# Patient Record
Sex: FEMALE | Race: BLACK OR AFRICAN AMERICAN | NOT HISPANIC OR LATINO | Employment: OTHER | URBAN - METROPOLITAN AREA
[De-identification: names, ages, dates, MRNs, and addresses within clinical notes are randomized per-mention and may not be internally consistent; named-entity substitution may affect disease eponyms.]

---

## 2017-09-14 ENCOUNTER — TRANSCRIBE ORDERS (OUTPATIENT)
Dept: ADMINISTRATIVE | Facility: HOSPITAL | Age: 62
End: 2017-09-14

## 2017-09-14 DIAGNOSIS — Z12.31 VISIT FOR SCREENING MAMMOGRAM: Primary | ICD-10-CM

## 2017-10-13 ENCOUNTER — APPOINTMENT (OUTPATIENT)
Dept: MAMMOGRAPHY | Facility: HOSPITAL | Age: 62
End: 2017-10-13
Attending: FAMILY MEDICINE

## 2017-10-17 ENCOUNTER — HOSPITAL ENCOUNTER (OUTPATIENT)
Dept: MAMMOGRAPHY | Facility: HOSPITAL | Age: 62
Discharge: HOME | End: 2017-10-17
Attending: FAMILY MEDICINE | Admitting: FAMILY MEDICINE

## 2017-10-17 DIAGNOSIS — Z12.31 VISIT FOR SCREENING MAMMOGRAM: ICD-10-CM

## 2017-10-17 PROCEDURE — G0202 SCR MAMMO BI INCL CAD: HCPCS

## 2017-10-17 PROCEDURE — 77063 BREAST TOMOSYNTHESIS BI: CPT | Performed by: RADIOLOGY

## 2017-10-17 PROCEDURE — 77063 BREAST TOMOSYNTHESIS BI: CPT

## 2017-10-17 PROCEDURE — 77067 SCR MAMMO BI INCL CAD: CPT | Performed by: RADIOLOGY

## 2018-08-31 ENCOUNTER — TRANSCRIBE ORDERS (OUTPATIENT)
Dept: ADMINISTRATIVE | Facility: HOSPITAL | Age: 63
End: 2018-08-31

## 2018-08-31 DIAGNOSIS — M25.562 PAIN IN JOINT INVOLVING LEFT LOWER LEG: Primary | ICD-10-CM

## 2018-09-06 ENCOUNTER — TRANSCRIBE ORDERS (OUTPATIENT)
Dept: ADMINISTRATIVE | Facility: HOSPITAL | Age: 63
End: 2018-09-06

## 2018-09-06 DIAGNOSIS — Z12.31 VISIT FOR SCREENING MAMMOGRAM: Primary | ICD-10-CM

## 2018-10-18 ENCOUNTER — APPOINTMENT (OUTPATIENT)
Dept: MAMMOGRAPHY | Facility: HOSPITAL | Age: 63
End: 2018-10-18
Attending: FAMILY MEDICINE

## 2018-11-20 ENCOUNTER — HOSPITAL ENCOUNTER (OUTPATIENT)
Dept: MAMMOGRAPHY | Facility: HOSPITAL | Age: 63
Discharge: HOME | End: 2018-11-20
Attending: FAMILY MEDICINE | Admitting: FAMILY MEDICINE

## 2018-11-20 DIAGNOSIS — Z12.31 VISIT FOR SCREENING MAMMOGRAM: ICD-10-CM

## 2018-11-20 PROCEDURE — 77067 SCR MAMMO BI INCL CAD: CPT

## 2018-11-20 PROCEDURE — 77063 BREAST TOMOSYNTHESIS BI: CPT

## 2018-11-20 PROCEDURE — 77067 SCR MAMMO BI INCL CAD: CPT | Performed by: RADIOLOGY

## 2018-11-20 PROCEDURE — 77063 BREAST TOMOSYNTHESIS BI: CPT | Performed by: RADIOLOGY

## 2018-12-04 ENCOUNTER — HOSPITAL ENCOUNTER (OUTPATIENT)
Dept: MAMMOGRAPHY | Facility: HOSPITAL | Age: 63
Discharge: HOME | End: 2018-12-04
Admitting: RADIOLOGY

## 2018-12-04 ENCOUNTER — HOSPITAL ENCOUNTER (OUTPATIENT)
Dept: ULTRASOUND IMAGING | Facility: HOSPITAL | Age: 63
Discharge: HOME | End: 2018-12-04

## 2018-12-04 DIAGNOSIS — R92.8 ABNORMAL MAMMOGRAM: ICD-10-CM

## 2018-12-04 PROCEDURE — 77065 DX MAMMO INCL CAD UNI: CPT | Performed by: RADIOLOGY

## 2018-12-04 PROCEDURE — 76642 ULTRASOUND BREAST LIMITED: CPT | Performed by: RADIOLOGY

## 2018-12-04 PROCEDURE — 77065 DX MAMMO INCL CAD UNI: CPT

## 2018-12-04 PROCEDURE — 76642 ULTRASOUND BREAST LIMITED: CPT

## 2018-12-04 PROCEDURE — G0279 TOMOSYNTHESIS, MAMMO: HCPCS

## 2018-12-04 PROCEDURE — 77061 BREAST TOMOSYNTHESIS UNI: CPT | Performed by: RADIOLOGY

## 2018-12-14 ENCOUNTER — TELEPHONE (OUTPATIENT)
Dept: MAMMOGRAPHY | Facility: HOSPITAL | Age: 63
End: 2018-12-14

## 2018-12-17 ENCOUNTER — TRANSCRIBE ORDERS (OUTPATIENT)
Dept: MAMMOGRAPHY | Facility: HOSPITAL | Age: 63
End: 2018-12-17

## 2018-12-17 DIAGNOSIS — R92.8 ABNORMAL MAMMOGRAM: Primary | ICD-10-CM

## 2019-01-18 ENCOUNTER — HOSPITAL ENCOUNTER (OUTPATIENT)
Dept: MAMMOGRAPHY | Facility: HOSPITAL | Age: 64
Discharge: HOME | End: 2019-01-18
Admitting: RADIOLOGY

## 2019-01-18 ENCOUNTER — HOSPITAL ENCOUNTER (OUTPATIENT)
Dept: MAMMOGRAPHY | Facility: HOSPITAL | Age: 64
Discharge: HOME | End: 2019-01-18

## 2019-01-18 DIAGNOSIS — R92.8 ABNORMAL MAMMOGRAM: ICD-10-CM

## 2019-01-18 PROCEDURE — A4648 IMPLANTABLE TISSUE MARKER: HCPCS

## 2019-01-18 PROCEDURE — 77065 DX MAMMO INCL CAD UNI: CPT | Performed by: RADIOLOGY

## 2019-01-18 PROCEDURE — 88360 TUMOR IMMUNOHISTOCHEM/MANUAL: CPT | Performed by: FAMILY MEDICINE

## 2019-01-18 PROCEDURE — 19081 BX BREAST 1ST LESION STRTCTC: CPT | Performed by: RADIOLOGY

## 2019-01-18 PROCEDURE — 88305 TISSUE EXAM BY PATHOLOGIST: CPT | Performed by: FAMILY MEDICINE

## 2019-01-18 RX ORDER — LIDOCAINE HYDROCHLORIDE AND EPINEPHRINE 10; 10 MG/ML; UG/ML
10 INJECTION, SOLUTION INFILTRATION; PERINEURAL ONCE
Status: COMPLETED | OUTPATIENT
Start: 2019-01-18 | End: 2019-01-18

## 2019-01-18 RX ORDER — LIDOCAINE HYDROCHLORIDE 10 MG/ML
5 INJECTION, SOLUTION INFILTRATION; PERINEURAL ONCE
Status: COMPLETED | OUTPATIENT
Start: 2019-01-18 | End: 2019-01-18

## 2019-01-18 RX ADMIN — LIDOCAINE HYDROCHLORIDE,EPINEPHRINE BITARTRATE 5 ML: 10; .01 INJECTION, SOLUTION INFILTRATION; PERINEURAL at 13:40

## 2019-01-18 RX ADMIN — LIDOCAINE HYDROCHLORIDE 2 ML: 10 INJECTION, SOLUTION INFILTRATION; PERINEURAL at 13:35

## 2019-01-22 LAB
CYTO UR: NORMAL
LAB AP CASE REPORT: NORMAL
LAB AP CLINICAL INFORMATION: NORMAL
LAB AP DIAGNOSIS COMMENT: NORMAL
LAB AP SPECIAL STAINS: NORMAL
PATH REPORT.FINAL DX SPEC: NORMAL
PATH REPORT.GROSS SPEC: NORMAL

## 2019-01-23 ENCOUNTER — TELEPHONE (OUTPATIENT)
Dept: MAMMOGRAPHY | Facility: HOSPITAL | Age: 64
End: 2019-01-23

## 2019-01-23 NOTE — TELEPHONE ENCOUNTER
Referring provider's office notified pathology returned as cancer & patient will be notified. Pt notified of pathology results and recommendation. Verbalizes understanding. Denies discomfort. Denies signs and symptoms of infection. Pt desires to research surgeon choice. Pt is to call back with decision; an appointment will then be scheduled. Verbalizes understanding. Patient encouraged to call back with any questions or concerns. Patient verbalized understanding. Breast cancer information packet offered and accepted.

## 2019-01-24 ENCOUNTER — TELEPHONE (OUTPATIENT)
Dept: MAMMOGRAPHY | Facility: HOSPITAL | Age: 64
End: 2019-01-24

## 2019-01-24 NOTE — TELEPHONE ENCOUNTER
Patient called in; notified of surgical consult appointment with Dr. Lim on 02.20.19 @8229. Told to bring photo ID, insurance cards & list of current medications. Questions answered, support given. Patient was encouraged to call back with any questions or concerns. Patient verbalized understanding.

## 2019-01-30 ENCOUNTER — TELEPHONE (OUTPATIENT)
Dept: MAMMOGRAPHY | Facility: HOSPITAL | Age: 64
End: 2019-01-30

## 2019-01-30 NOTE — TELEPHONE ENCOUNTER
Returned;  questions answered, support given. Encouraged to call back with further questions or concerns. Patient verbalized understanding.

## 2019-02-20 ENCOUNTER — CLINICAL SUPPORT (OUTPATIENT)
Dept: GENETICS | Facility: HOSPITAL | Age: 64
End: 2019-02-20

## 2019-02-20 ENCOUNTER — APPOINTMENT (OUTPATIENT)
Dept: LAB | Facility: HOSPITAL | Age: 64
End: 2019-02-20

## 2019-02-20 DIAGNOSIS — C50.912 MALIGNANT NEOPLASM OF LEFT BREAST IN FEMALE, ESTROGEN RECEPTOR POSITIVE, UNSPECIFIED SITE OF BREAST (HCC): Primary | ICD-10-CM

## 2019-02-20 DIAGNOSIS — Z80.42 FAMILY HISTORY OF PROSTATE CANCER: ICD-10-CM

## 2019-02-20 DIAGNOSIS — Z80.0 FAMILY HISTORY OF PANCREATIC CANCER: ICD-10-CM

## 2019-02-20 DIAGNOSIS — Z13.79 GENETIC TESTING: Primary | ICD-10-CM

## 2019-02-20 DIAGNOSIS — Z17.0 MALIGNANT NEOPLASM OF LEFT BREAST IN FEMALE, ESTROGEN RECEPTOR POSITIVE, UNSPECIFIED SITE OF BREAST (HCC): Primary | ICD-10-CM

## 2019-02-20 DIAGNOSIS — Z80.3 FAMILY HISTORY OF BREAST CANCER: ICD-10-CM

## 2019-02-20 DIAGNOSIS — Z80.0 FAMILY HISTORY OF COLON CANCER: ICD-10-CM

## 2019-02-20 PROCEDURE — 96040: CPT | Performed by: GENETIC COUNSELOR, MS

## 2019-02-21 ENCOUNTER — DOCUMENTATION (OUTPATIENT)
Dept: ONCOLOGY | Facility: CLINIC | Age: 64
End: 2019-02-21

## 2019-02-21 NOTE — PROGRESS NOTES
I saw patient on 2/20/19 with Dr SHAFFER and. Dr SHAFFER reviewed the pathology report and surgical options. The patient prefers BCT and saw genetic 2/20/19. An MRI has been ordered - educational and supportive materials were given and reviewed.

## 2019-02-21 NOTE — PROGRESS NOTES
Gloria Alvarado, a 63-year-old female, was seen for genetic counseling due to a personal history of breast cancer. Ms. Alvarado was recently diagnosed with an ER/LA positive breast cancer of the left breast at 63. She is making a surgical decision. She had a hysterectomy at age 23 and a BSO at 25. She was interested in discussing her risk for a hereditary cancer syndrome.  Ms. Alvarado was interested in pursuing a multi-gene panel to evaluate her risk of cancer, therefore the CancerNext panel was ordered through [facility] which analyzes BRCA1/2 and 32 additional genes associated with an increased cancer risk. The genes on this panel include APC, LAURA, BARD1, BMPR1A, BRCA1, BRCA2, BRIP1, CDH1, CDK4, CDKN2A, CHEK2, DICER1, EPCAM, GREM1, HOXB13, MLH1, MRE11A, MSH2, MSH6, MUTYH, NBN, NF1, PALB2, PMS2, POLD1, POLE, PTEN, RAD50, RAD51C, RAD51D, SMAD4, SMARCA4, STK11, and TP53. Results are expected within 2-3 weeks.     PERTINENT FAMILY HISTORY: (See attached pedigree)   Sister:   Breast cancer, 52  Brother:  Colon cancer, early 60s; Prostate cancer, mid 60s; Pancreatic cancer, 60s  Brother:  Prostate cancer, 60s  Pat. Aunt:  Breast cancer, 60s; Colon cancer  Pat. Aunt:  Colon cancer, 70s  Mother:  Cervical cancer, 40s  Mat. Grandmother: Cervical cancer, 60s  Mat. Uncle:  Lung cancer    We do not have medical records regarding any of these diagnoses.      RISK ASSESSMENT:  Ms. Alvarado’s personal and family history of cancer led to concern for a hereditary cancer syndrome. We discussed BRCA1/2 testing as well as the option of pursuing a panel that would test for other genes known to impact cancer risk in addition to BRCA1/2.   Ms. Alvarado clearly meets NCCN guidelines criteria for BRCA1/2 testing based on her personal and family history of breast cancer. These risk assessments are based on the family history information provided at the time of the appointment, and could change in the future should new information be  obtained.    GENETIC COUNSELING (60 minutes):  We reviewed the family history information in detail. Cases of cancer follow three general patterns: sporadic, familial, and hereditary.  While most cancer is sporadic, some cases appear to occur in family clusters.  These cases are said to be familial and account for 10-20% of cancer cases.  Familial cases may be due to a combination of shared genes and environmental factors among family members.  In even fewer families, the cancer is said to be inherited, and the genes responsible for the cancer are known.      Family histories typical of hereditary cancer syndromes usually include multiple first- and second-degree relatives diagnosed with cancer types that define a syndrome.  These cases tend to be diagnosed at younger-than-expected ages and can be bilateral or multifocal.  The cancer in these families follows an autosomal dominant inheritance pattern, which indicates the likely presence of a mutation in a cancer susceptibility gene.  Children and siblings of an individual believed to carry this mutation have a 50% chance of inheriting that mutation, thereby inheriting the increased risk to develop cancer.  These mutations can be passed down from the maternal or the paternal lineage.    Hereditary breast cancer accounts for 5-10% of all cases of breast cancer.  A significant proportion of hereditary breast and ovarian cancer can be attributed to mutations in the BRCA1 and BRCA2 genes.  Mutations in these genes confer an increased risk for breast cancer, ovarian cancer, male breast cancer, prostate cancer, and pancreatic cancer. Women with a BRCA1 or BRCA2 mutation who have already been diagnosed with breast cancer have a 40-60% lifetime risk of a second breast cancer. Women with a BRCA1 or BRCA2 mutation have up to a 44% risk of ovarian cancer.      We discussed that there are other genes that are known to be associated with an increased risk for cancer.  Some of  these genes have well defined cancer risks and established management guidelines.  Other genes that can be tested for have been more recently described, and there may be less data regarding the risks and therefore may not have established management guidelines. We discussed these limitations at length.  Based on Ms. Alvarado’s desire to get as much information as possible regarding her personal risks and potential risks for her family, she opted to pursue testing through a panel that would look at several other genes known to increase the risk for cancer.    GENETIC TESTING:  The risks, benefits and limitations of genetic testing and implications for clinical management following testing were reviewed.  DNA test results can influence decisions regarding screening, prevention and surgical management.  Genetic testing can have significant psychological implications for both individuals and families.  Also discussed was the possibility of employment and insurance discrimination based on genetic test results and the laws in place to prevent this ([place]).    We discussed panel testing, which would involve testing for BRCA1/2 as well as 32 additional genes that are associated with increased cancer risk. The benefits and limitations of genetic testing were discussed and Ms. Alvarado decided to pursue testing via the panel. The implications of a positive or negative test result were discussed. We discussed the possibility that, in some cases, genetic test results may be informative or may be ambiguous due to the identification of a genetic variant. These variants may or may not be associated with an increased cancer risk.  With multigene panel testing, it is not uncommon for a variant of uncertain significance (VUS) to be identified.  If a VUS is identified, testing family members is typically not recommended and screening recommendations are made based on the family history.  The laboratories that perform genetic testing work  to reclassify the VUS and send out an amended report if and when a VUS is reclassified.  The majority of variant findings are ultimately reclassified to a negative result.  Given her personal and family history, a negative test result would not eliminate all cancer risk to her relatives, although the risk would not be as high as it would with positive genetic testing.      PLAN: Genetic testing via the CancerNext panel through [facility] was ordered and results are expected within 2-3 weeks. We will call her to discuss these results once they are received. Ms. Alvarado is welcome to contact us in the meantime with any questions she may have.      Juanis Romano MS, [facility] [place], [facility]  Licensed Certified Genetic Counselor

## 2019-02-22 ENCOUNTER — TELEPHONE (OUTPATIENT)
Dept: ONCOLOGY | Facility: CLINIC | Age: 64
End: 2019-02-22

## 2019-02-22 NOTE — TELEPHONE ENCOUNTER
Patient called requesting something to take for claustrophobia prior to MRI breast 02/26/2019. Notified Dr. SHAFFER's assistant. She will review with him Monday and call in prescription to [facility] [address] if directed. Encouraged patient to call with questions or concerns.

## 2019-02-26 ENCOUNTER — DOCUMENTATION (OUTPATIENT)
Dept: ONCOLOGY | Facility: CLINIC | Age: 64
End: 2019-02-26

## 2019-02-26 ENCOUNTER — HOSPITAL ENCOUNTER (OUTPATIENT)
Dept: MRI IMAGING | Facility: HOSPITAL | Age: 64
Discharge: HOME | End: 2019-02-26
Admitting: SURGERY

## 2019-02-26 DIAGNOSIS — C50.212 MALIGNANT NEOPLASM OF UPPER-INNER QUADRANT OF LEFT FEMALE BREAST (HCC): ICD-10-CM

## 2019-02-26 LAB — CREAT BLDA-MCNC: 1 MG/DL (ref 0.6–1.3)

## 2019-02-26 PROCEDURE — A9577 INJ MULTIHANCE: HCPCS | Performed by: SURGERY

## 2019-02-26 PROCEDURE — 0 GADOBENATE DIMEGLUMINE 529 MG/ML SOLUTION: Performed by: SURGERY

## 2019-02-26 PROCEDURE — 77049 MRI BREAST C-+ W/CAD BI: CPT | Performed by: RADIOLOGY

## 2019-02-26 PROCEDURE — 82565 ASSAY OF CREATININE: CPT

## 2019-02-26 PROCEDURE — 77049 MRI BREAST C-+ W/CAD BI: CPT

## 2019-02-26 RX ADMIN — GADOBENATE DIMEGLUMINE 14 ML: 529 INJECTION, SOLUTION INTRAVENOUS at 11:27

## 2019-02-27 ENCOUNTER — DOCUMENTATION (OUTPATIENT)
Dept: GENETICS | Facility: HOSPITAL | Age: 64
End: 2019-02-27

## 2019-02-28 ENCOUNTER — TELEPHONE (OUTPATIENT)
Dept: MRI IMAGING | Facility: HOSPITAL | Age: 64
End: 2019-02-28

## 2019-02-28 NOTE — TELEPHONE ENCOUNTER
Called pt with Breast MRI results. Pt recommended to have bilateral MRI BX scheduled for 3/6/2019 at 8:00 to arrive at 7:40. Pt to bring a . Pt to obtain some medication for claustrophobia. Pt has stopped her blood thinners. Pt expressed understanding and was encouraged to call with any further questions or concerns.

## 2019-03-04 ENCOUNTER — DOCUMENTATION (OUTPATIENT)
Dept: ONCOLOGY | Facility: CLINIC | Age: 64
End: 2019-03-04

## 2019-03-04 NOTE — PROGRESS NOTES
Patient called to ask for another script for valium for her MRI guided biopsy tomorrow -BUSHRA Ramírez at [facility] said she would call it in. Patient aware

## 2019-03-06 ENCOUNTER — HOSPITAL ENCOUNTER (OUTPATIENT)
Dept: MRI IMAGING | Facility: HOSPITAL | Age: 64
Discharge: HOME | End: 2019-03-06
Admitting: RADIOLOGY

## 2019-03-06 ENCOUNTER — HOSPITAL ENCOUNTER (OUTPATIENT)
Dept: MAMMOGRAPHY | Facility: HOSPITAL | Age: 64
Discharge: HOME | End: 2019-03-06

## 2019-03-06 ENCOUNTER — HOSPITAL ENCOUNTER (OUTPATIENT)
Dept: MRI IMAGING | Facility: HOSPITAL | Age: 64
Discharge: HOME | End: 2019-03-06

## 2019-03-06 DIAGNOSIS — C50.212 MALIGNANT NEOPLASM OF UPPER-INNER QUADRANT OF LEFT FEMALE BREAST (HCC): ICD-10-CM

## 2019-03-06 PROCEDURE — 88305 TISSUE EXAM BY PATHOLOGIST: CPT | Performed by: SURGERY

## 2019-03-06 PROCEDURE — A4648 IMPLANTABLE TISSUE MARKER: HCPCS

## 2019-03-06 PROCEDURE — 77065 DX MAMMO INCL CAD UNI: CPT | Performed by: RADIOLOGY

## 2019-03-06 PROCEDURE — 0 GADOBENATE DIMEGLUMINE 529 MG/ML SOLUTION: Performed by: SURGERY

## 2019-03-06 PROCEDURE — 19085 BX BREAST 1ST LESION MR IMAG: CPT | Performed by: RADIOLOGY

## 2019-03-06 PROCEDURE — A9577 INJ MULTIHANCE: HCPCS | Performed by: SURGERY

## 2019-03-06 RX ORDER — LIDOCAINE HYDROCHLORIDE 10 MG/ML
2.5 INJECTION, SOLUTION INFILTRATION; PERINEURAL
Status: COMPLETED | OUTPATIENT
Start: 2019-03-06 | End: 2019-03-06

## 2019-03-06 RX ORDER — LIDOCAINE HYDROCHLORIDE AND EPINEPHRINE 10; 10 MG/ML; UG/ML
4 INJECTION, SOLUTION INFILTRATION; PERINEURAL
Status: COMPLETED | OUTPATIENT
Start: 2019-03-06 | End: 2019-03-06

## 2019-03-06 RX ADMIN — GADOBENATE DIMEGLUMINE 13 ML: 529 INJECTION, SOLUTION INTRAVENOUS at 08:58

## 2019-03-06 RX ADMIN — LIDOCAINE HYDROCHLORIDE,EPINEPHRINE BITARTRATE 4 ML: 10; .01 INJECTION, SOLUTION INFILTRATION; PERINEURAL at 09:00

## 2019-03-06 RX ADMIN — LIDOCAINE HYDROCHLORIDE 2.5 ML: 10 INJECTION, SOLUTION INFILTRATION; PERINEURAL at 08:58

## 2019-03-07 LAB
CYTO UR: NORMAL
LAB AP CASE REPORT: NORMAL
LAB AP CLINICAL INFORMATION: NORMAL
LAB AP DIAGNOSIS COMMENT: NORMAL
PATH REPORT.FINAL DX SPEC: NORMAL
PATH REPORT.GROSS SPEC: NORMAL

## 2019-03-11 ENCOUNTER — TELEPHONE (OUTPATIENT)
Dept: MAMMOGRAPHY | Facility: HOSPITAL | Age: 64
End: 2019-03-11

## 2019-03-13 ENCOUNTER — TRANSCRIBE ORDERS (OUTPATIENT)
Dept: PREADMISSION TESTING | Facility: HOSPITAL | Age: 64
End: 2019-03-13

## 2019-03-13 ENCOUNTER — TRANSCRIBE ORDERS (OUTPATIENT)
Dept: ADMINISTRATIVE | Facility: HOSPITAL | Age: 64
End: 2019-03-13

## 2019-03-13 ENCOUNTER — TRANSCRIBE ORDERS (OUTPATIENT)
Dept: MAMMOGRAPHY | Facility: HOSPITAL | Age: 64
End: 2019-03-13

## 2019-03-13 DIAGNOSIS — C50.212 MALIGNANT NEOPLASM OF UPPER-INNER QUADRANT OF LEFT FEMALE BREAST, UNSPECIFIED ESTROGEN RECEPTOR STATUS (HCC): Primary | ICD-10-CM

## 2019-03-20 ENCOUNTER — DOCUMENTATION (OUTPATIENT)
Dept: ONCOLOGY | Facility: CLINIC | Age: 64
End: 2019-03-20

## 2019-03-20 NOTE — PROGRESS NOTES
Patient called and left message to ask for note from Dr SHAFFER that she could be off work for 1 week after surgery - I called her back and left her a message to call me

## 2019-03-20 NOTE — PROGRESS NOTES
Patient called to ask for letter to be sent for Jury Duty release - I sent email to BUSHRA Ramírez with all the information and she stated that she would have Dr DEENA mckenzie letter and she would mail it in - patient aware

## 2019-03-27 ENCOUNTER — APPOINTMENT (OUTPATIENT)
Dept: PREADMISSION TESTING | Facility: HOSPITAL | Age: 64
End: 2019-03-27

## 2019-03-27 LAB
ALBUMIN SERPL-MCNC: 4.66 G/DL (ref 3.2–4.8)
ALBUMIN/GLOB SERPL: 2.1 G/DL (ref 1.5–2.5)
ALP SERPL-CCNC: 78 U/L (ref 25–100)
ALT SERPL W P-5'-P-CCNC: 17 U/L (ref 7–40)
ANION GAP SERPL CALCULATED.3IONS-SCNC: 11 MMOL/L (ref 3–11)
AST SERPL-CCNC: 20 U/L (ref 0–33)
BILIRUB SERPL-MCNC: 0.5 MG/DL (ref 0.3–1.2)
BUN BLD-MCNC: 17 MG/DL (ref 9–23)
BUN/CREAT SERPL: 17 (ref 7–25)
CALCIUM SPEC-SCNC: 10.2 MG/DL (ref 8.7–10.4)
CHLORIDE SERPL-SCNC: 102 MMOL/L (ref 99–109)
CO2 SERPL-SCNC: 30 MMOL/L (ref 20–31)
CREAT BLD-MCNC: 1 MG/DL (ref 0.6–1.3)
DEPRECATED RDW RBC AUTO: 46.7 FL (ref 37–54)
ERYTHROCYTE [DISTWIDTH] IN BLOOD BY AUTOMATED COUNT: 14.3 % (ref 11.3–14.5)
GFR SERPL CREATININE-BSD FRML MDRD: 68 ML/MIN/1.73
GLOBULIN UR ELPH-MCNC: 2.2 GM/DL
GLUCOSE BLD-MCNC: 110 MG/DL (ref 70–100)
HCT VFR BLD AUTO: 43.2 % (ref 34.5–44)
HGB BLD-MCNC: 14 G/DL (ref 11.5–15.5)
MCH RBC QN AUTO: 29 PG (ref 27–31)
MCHC RBC AUTO-ENTMCNC: 32.4 G/DL (ref 32–36)
MCV RBC AUTO: 89.6 FL (ref 80–99)
PLATELET # BLD AUTO: 272 10*3/MM3 (ref 150–450)
PMV BLD AUTO: 11.4 FL (ref 6–12)
POTASSIUM BLD-SCNC: 4 MMOL/L (ref 3.5–5.5)
PROT SERPL-MCNC: 6.9 G/DL (ref 5.7–8.2)
RBC # BLD AUTO: 4.82 10*6/MM3 (ref 3.89–5.14)
SODIUM BLD-SCNC: 143 MMOL/L (ref 132–146)
WBC NRBC COR # BLD: 5.81 10*3/MM3 (ref 3.5–10.8)

## 2019-03-27 PROCEDURE — 85027 COMPLETE CBC AUTOMATED: CPT | Performed by: SURGERY

## 2019-03-27 PROCEDURE — 36415 COLL VENOUS BLD VENIPUNCTURE: CPT

## 2019-03-27 PROCEDURE — 80053 COMPREHEN METABOLIC PANEL: CPT | Performed by: SURGERY

## 2019-03-29 ENCOUNTER — HOSPITAL ENCOUNTER (OUTPATIENT)
Dept: MAMMOGRAPHY | Facility: HOSPITAL | Age: 64
Discharge: HOME | End: 2019-03-29

## 2019-03-29 ENCOUNTER — HOSPITAL ENCOUNTER (OUTPATIENT)
Dept: MAMMOGRAPHY | Facility: HOSPITAL | Age: 64
Discharge: HOME | End: 2019-03-29
Admitting: SURGERY

## 2019-03-29 ENCOUNTER — LAB REQUISITION (OUTPATIENT)
Dept: LAB | Facility: HOSPITAL | Age: 64
End: 2019-03-29

## 2019-03-29 ENCOUNTER — HOSPITAL ENCOUNTER (OUTPATIENT)
Dept: NUCLEAR MEDICINE | Facility: HOSPITAL | Age: 64
Discharge: HOME | End: 2019-03-29

## 2019-03-29 DIAGNOSIS — C50.212 MALIGNANT NEOPLASM OF UPPER-INNER QUADRANT OF LEFT FEMALE BREAST, UNSPECIFIED ESTROGEN RECEPTOR STATUS (HCC): ICD-10-CM

## 2019-03-29 DIAGNOSIS — C50.212 MALIGNANT NEOPLASM OF UPPER-INNER QUADRANT OF LEFT FEMALE BREAST (HCC): ICD-10-CM

## 2019-03-29 PROCEDURE — 19281 PERQ DEVICE BREAST 1ST IMAG: CPT | Performed by: RADIOLOGY

## 2019-03-29 PROCEDURE — 76098 X-RAY EXAM SURGICAL SPECIMEN: CPT | Performed by: RADIOLOGY

## 2019-03-29 PROCEDURE — 88307 TISSUE EXAM BY PATHOLOGIST: CPT | Performed by: SURGERY

## 2019-03-29 PROCEDURE — A9541 TC99M SULFUR COLLOID: HCPCS | Performed by: SURGERY

## 2019-03-29 PROCEDURE — 0 TECHNETIUM FILTERED SULFUR COLLOID: Performed by: SURGERY

## 2019-03-29 PROCEDURE — 76098 X-RAY EXAM SURGICAL SPECIMEN: CPT

## 2019-03-29 PROCEDURE — 38792 RA TRACER ID OF SENTINL NODE: CPT

## 2019-03-29 RX ORDER — LIDOCAINE HYDROCHLORIDE 10 MG/ML
5 INJECTION, SOLUTION INFILTRATION; PERINEURAL ONCE
Status: COMPLETED | OUTPATIENT
Start: 2019-03-29 | End: 2019-03-29

## 2019-03-29 RX ADMIN — LIDOCAINE HYDROCHLORIDE 1 ML: 10 INJECTION, SOLUTION INFILTRATION; PERINEURAL at 09:15

## 2019-03-29 RX ADMIN — TECHNETIUM TC 99M SULFUR COLLOID 1 DOSE: KIT at 11:10

## 2019-03-29 RX ADMIN — METHYLENE BLUE 10 MG: 10 INJECTION INTRAVENOUS at 09:15

## 2019-04-03 ENCOUNTER — OFFICE VISIT (OUTPATIENT)
Dept: RADIATION ONCOLOGY | Facility: HOSPITAL | Age: 64
End: 2019-04-03

## 2019-04-03 ENCOUNTER — HOSPITAL ENCOUNTER (OUTPATIENT)
Dept: RADIATION ONCOLOGY | Facility: HOSPITAL | Age: 64
Setting detail: RADIATION/ONCOLOGY SERIES
Discharge: HOME | End: 2019-04-03

## 2019-04-03 DIAGNOSIS — Z17.0 MALIGNANT NEOPLASM OF UPPER-INNER QUADRANT OF LEFT BREAST IN FEMALE, ESTROGEN RECEPTOR POSITIVE (HCC): Primary | ICD-10-CM

## 2019-04-03 DIAGNOSIS — C50.212 MALIGNANT NEOPLASM OF UPPER-INNER QUADRANT OF LEFT BREAST IN FEMALE, ESTROGEN RECEPTOR POSITIVE (HCC): Primary | ICD-10-CM

## 2019-04-03 PROCEDURE — G0463 HOSPITAL OUTPT CLINIC VISIT: HCPCS

## 2019-04-03 NOTE — PROGRESS NOTES
CONSULTATION NOTE    NAME:      Gloria Alvarado  :                                                          1955  DATE OF CONSULTATION:                       4/3/2019   REQUESTING PHYSICIAN:                   Volodymyr Lim MD   REASON FOR CONSULTATION:           Cancer Staging  Malignant neoplasm of upper-inner quadrant of left breast in female, estrogen receptor positive (CMS/HCC)  Staging form: Breast, AJCC 8th Edition  - Pathologic stage from 4/3/2019: Stage IA (pT1b, pN0(sn), cM0, G1, ER: Positive, DE: Positive, HER2: Negative) - Signed by Lorri Orantes MD on 4/3/2019         BRIEF HISTORY:  Gloria Alvarado  is a very pleasant 63 y.o. female  who had an abnormality in the upper outer quadrant of the left breast.  Biopsy was positive for low-grade infiltrating ductal carcinoma.  On MRI she had an area of concern in the right breast as well but biopsy was negative for carcinoma.  She underwent left breast lumpectomy and was found to have a 1 cm, low-grade, ER DE positive tumor and extended margins were negative.  She is recovering well from surgery and here to discuss postoperative radiation.  She is scheduled to see Dr. Zhu next week regarding adjuvant chemotherapy and/or hormonal blockade.  She has no complaints today.  Allergies   Allergen Reactions   • Peanut-Containing Drug Products Shortness Of Breath       Social History     Tobacco Use   • Smoking status: Never Smoker   Substance Use Topics   • Alcohol use: No     Frequency: Never   • Drug use: No         Past Medical History:   Diagnosis Date   • Anxiety    • Arthritis    • Depression    • Hypertension    • Malignant neoplasm of upper-inner quadrant of left breast in female, estrogen receptor positive (CMS/HCC) 4/3/2019       family history includes Breast cancer in her paternal aunt; Breast cancer (age of onset: 54) in her sister.     Past Surgical History:   Procedure Laterality Date   • HEMORRHOIDECTOMY     • HYSTERECTOMY     •  KNEE SURGERY     • OOPHORECTOMY  1982        Review of Systems   Eyes:        Wears glasses or contact lenses.   Musculoskeletal: Positive for back pain and myalgias.   All other systems reviewed and are negative.          Objective   VITAL SIGNS: Weight 156 pounds height 64.5 inches blood pressure 140/77 pulse 66  KPS       90 %    Physical Exam   Constitutional: She is oriented to person, place, and time. She appears well-developed and well-nourished. No distress.   HENT:   Head: Normocephalic and atraumatic.   Eyes: EOM are normal. No scleral icterus.   Neck: Neck supple.   Cardiovascular: Normal rate and regular rhythm.   Pulmonary/Chest: Effort normal and breath sounds normal.   Lymphadenopathy:     She has no cervical adenopathy.   Neurological: She is alert and oriented to person, place, and time.   Nursing note and vitals reviewed.  The breast exam reveals the breasts are symmetric.  The right breast has no masses or suspicious lesions.  The left breast has a well-healed surgical incision from the 10:00 to 12:00 o'clock position adjacent to the nipple areolar complex.  She has a well-healed incision in the left axilla.  She has no axillary adenopathy bilaterally.         The following portions of the patient's history were reviewed and updated as appropriate: allergies, current medications, past family history, past medical history, past social history, past surgical history and problem list.    Assessment      IMPRESSION:   Gloria Alvarado   had an abnormality in the upper outer quadrant of the left breast and biopsy was positive for low-grade infiltrating ductal carcinoma.  An area of concern in the right breast had biopsy negative for carcinoma.  She underwent left breast lumpectomy for a 1 cm, low-grade, ER ME positive tumor and extended margins were negative.  She is recovering well from surgery and here to discuss postoperative radiation.  She is scheduled to see Dr. Emmanuel next week regarding adjuvant  chemotherapy and/or hormonal blockade.  She has no complaints today.    RECOMMENDATIONS: I recommend postoperative radiotherapy of 40.05 Gy in 15 fractions and no tumor bed boost for this low-grade tumor in a patient over 60 years old.  The pros and cons, risks and benefits of treatment were discussed and informed consent obtained.  An appointment is made for her return to our department for treatment planning after her appointment with Dr. Emmanuel.  Thank you very much for letting me part spent in her care..          Lorri Orantes MD      Errors in dictation may reflect use of voice recognition software and not all errors in transcription may have been detected prior to signing.

## 2019-04-04 LAB
CYTO UR: NORMAL
LAB AP CASE REPORT: NORMAL
LAB AP CLINICAL INFORMATION: NORMAL
PATH REPORT.FINAL DX SPEC: NORMAL
PATH REPORT.GROSS SPEC: NORMAL

## 2019-04-10 ENCOUNTER — CONSULT (OUTPATIENT)
Dept: ONCOLOGY | Facility: CLINIC | Age: 64
End: 2019-04-10

## 2019-04-10 VITALS
HEART RATE: 74 BPM | TEMPERATURE: 97.6 F | SYSTOLIC BLOOD PRESSURE: 134 MMHG | RESPIRATION RATE: 18 BRPM | WEIGHT: 157 LBS | OXYGEN SATURATION: 99 % | DIASTOLIC BLOOD PRESSURE: 89 MMHG

## 2019-04-10 DIAGNOSIS — Z17.0 MALIGNANT NEOPLASM OF UPPER-INNER QUADRANT OF LEFT BREAST IN FEMALE, ESTROGEN RECEPTOR POSITIVE (HCC): Primary | ICD-10-CM

## 2019-04-10 DIAGNOSIS — C50.212 MALIGNANT NEOPLASM OF UPPER-INNER QUADRANT OF LEFT BREAST IN FEMALE, ESTROGEN RECEPTOR POSITIVE (HCC): Primary | ICD-10-CM

## 2019-04-10 PROCEDURE — 99204 OFFICE O/P NEW MOD 45 MIN: CPT | Performed by: INTERNAL MEDICINE

## 2019-04-10 RX ORDER — ANASTROZOLE 1 MG/1
1 TABLET ORAL DAILY
Qty: 30 TABLET | Refills: 11 | Status: SHIPPED | OUTPATIENT
Start: 2019-04-10 | End: 2020-04-06

## 2019-04-10 RX ORDER — ATENOLOL AND CHLORTHALIDONE TABLET 50; 25 MG/1; MG/1
0.5 TABLET ORAL DAILY
COMMUNITY
Start: 2019-01-17

## 2019-04-10 NOTE — PROGRESS NOTES
Subjective     PROBLEM LIST:  1. [id number] (Stage IA) ER+ NM+ Her2 negative invasive ductal carcinoma of the left breast  A) left lumpectomy on 3/29/19 showed a 1.0 cm low grade IDC.  0/3 SLN involved  2. Anxiety/depression  3. Hypertension  4. arthritis    CHIEF COMPLAINT: breast cancer      HISTORY OF PRESENT ILLNESS:  The patient is a 63 y.o. year old female, referred for evaluation of a recently diagnosed breast cancer.  The lesion was found on a routine screening mammogram which she gets every year.  She had a biopsy and underwent lumpectomy about 2 weeks ago.  She says she is recovering well from the surgery.  She has a history of a hysterectomy several years ago and was on hormone replacement therapy until about 5-10 years ago.  She says when she stopped this she had a lot of hot flashes and mood changes but this has all resolved.  She has a family history of breast cancer in her sister and paternal aunt.  She had genetic testing done which was negative.    She lives alone.  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    REVIEW OF SYSTEMS:  A 14 point review of systems was performed and is negative except as noted above.    Past Medical History:   Diagnosis Date   • Anxiety    • Arthritis    • Depression    • Hypertension    • Malignant neoplasm of upper-inner quadrant of left breast in female, estrogen receptor positive (CMS/[facility]) 4/3/2019       GYN History: .  Menarche at 14    Current Outpatient Medications on File Prior to Visit   Medication Sig Dispense Refill   • atenolol-chlorthalidone (TENORETIC) 50-25 MG per tablet      • [DISCONTINUED] HYDROcodone-acetaminophen (NORCO) 5-325 MG per tablet Take 1 tablet by mouth Every 6 (Six) Hours As Needed. 12 tablet 0     No current facility-administered medications on file prior to visit.        Allergies   Allergen Reactions   • Peanut-Containing Drug Products Shortness Of Breath       Past  Surgical History:   Procedure Laterality Date   • BREAST LUMPECTOMY Left    • HEMORRHOIDECTOMY     • HYSTERECTOMY  1981   • KNEE SURGERY     • OOPHORECTOMY  1982       Social History     Socioeconomic History   • Marital status:      Spouse name: Not on file   • Number of children: Not on file   • Years of education: Not on file   • Highest education level: Not on file   Tobacco Use   • Smoking status: Never Smoker   Substance and Sexual Activity   • Alcohol use: No     Frequency: Never   • Drug use: No       Family History   Problem Relation Age of Onset   • Breast cancer Sister 54   • Breast cancer Paternal Aunt         DX AGE 60'S   • Ovarian cancer Neg Hx        Objective     /89   Pulse 74   Temp 97.6 °F (36.4 °C) (Temporal)   Resp 18   Wt 71.2 kg (157 lb)   SpO2 99%   Performance Status: 0  General: well appearing female in no acute distress  Neuro: alert and oriented  HEENT: sclera anicteric, oropharynx clear  Lymphatics: no cervical, supraclavicular, or axillary adenopathy  Cardiovascular: regular rate and rhythm, no murmurs  Lungs: clear to auscultation bilaterally  Abdomen: soft, nontender, nondistended.  No palpable organomegaly  Extremeties: no lower extremity edema  Skin: no rashes, lesions, bruising, or petechiae  Psych: mood and affect appropriate            Assessment/Plan     Gloria Alvarado is a 63 y.o. year old female with a stage IA ER+ Her2 negative left breast cancer.  She presents today for discussion of adjuvant therapy.  She has met with Dr. Orantes and will plan on receiving radiotherapy.    Her tumor is small and has low risk features.  In this setting, I don't think there is any significant benefit from the use of chemotherapy.  I would recommend adjuvant endocrine therapy with an aromatase inhibitor.  We reviewed the potential side effects of anastrozole including hot flashes, vaginal dryness, joint pain, decrease in bone density, and mood or sleep disturbance.    We  will do a baseline bone density scan prior to her return.  I sent a prescription for anastrozole to her pharmacy and she can start taking this after radiation is complete.    She will follow-up in 3 months to see how she is tolerating the medicine.  Assuming she is doing well we will see her every 6 months.             Nafisa Emmanuel MD    4/10/2019

## 2019-04-11 ENCOUNTER — TELEPHONE (OUTPATIENT)
Dept: MAMMOGRAPHY | Facility: HOSPITAL | Age: 64
End: 2019-04-11

## 2019-04-11 NOTE — TELEPHONE ENCOUNTER
Returned pt call; questions answered, support given.  Encouraged to call back or contact KRISTY Ortez with further questions or concerns. Patient verbalized understanding.

## 2019-04-22 ENCOUNTER — HOSPITAL ENCOUNTER (OUTPATIENT)
Dept: RADIATION ONCOLOGY | Facility: HOSPITAL | Age: 64
Discharge: HOME | End: 2019-04-22

## 2019-04-22 PROCEDURE — 77290 THER RAD SIMULAJ FIELD CPLX: CPT | Performed by: RADIOLOGY

## 2019-04-22 PROCEDURE — 77332 RADIATION TREATMENT AID(S): CPT | Performed by: RADIOLOGY

## 2019-04-30 PROCEDURE — 77334 RADIATION TREATMENT AID(S): CPT | Performed by: RADIOLOGY

## 2019-04-30 PROCEDURE — 77300 RADIATION THERAPY DOSE PLAN: CPT | Performed by: RADIOLOGY

## 2019-04-30 PROCEDURE — 77295 3-D RADIOTHERAPY PLAN: CPT | Performed by: RADIOLOGY

## 2019-05-01 ENCOUNTER — HOSPITAL ENCOUNTER (OUTPATIENT)
Dept: RADIATION ONCOLOGY | Facility: HOSPITAL | Age: 64
Setting detail: RADIATION/ONCOLOGY SERIES
Discharge: HOME | End: 2019-05-01

## 2019-05-08 ENCOUNTER — HOSPITAL ENCOUNTER (OUTPATIENT)
Dept: RADIATION ONCOLOGY | Facility: HOSPITAL | Age: 64
Discharge: HOME | End: 2019-05-08

## 2019-05-08 PROCEDURE — 77280 THER RAD SIMULAJ FIELD SMPL: CPT | Performed by: RADIOLOGY

## 2019-05-09 ENCOUNTER — HOSPITAL ENCOUNTER (OUTPATIENT)
Dept: RADIATION ONCOLOGY | Facility: HOSPITAL | Age: 64
Discharge: HOME | End: 2019-05-09

## 2019-05-09 PROCEDURE — 77412 RADIATION TX DELIVERY LVL 3: CPT | Performed by: RADIOLOGY

## 2019-05-09 PROCEDURE — 77387 GUIDANCE FOR RADJ TX DLVR: CPT | Performed by: RADIOLOGY

## 2019-05-09 PROCEDURE — 77336 RADIATION PHYSICS CONSULT: CPT | Performed by: RADIOLOGY

## 2019-05-10 ENCOUNTER — HOSPITAL ENCOUNTER (OUTPATIENT)
Dept: RADIATION ONCOLOGY | Facility: HOSPITAL | Age: 64
Discharge: HOME | End: 2019-05-10

## 2019-05-10 PROCEDURE — 77387 GUIDANCE FOR RADJ TX DLVR: CPT | Performed by: RADIOLOGY

## 2019-05-10 PROCEDURE — 77412 RADIATION TX DELIVERY LVL 3: CPT | Performed by: RADIOLOGY

## 2019-05-13 ENCOUNTER — HOSPITAL ENCOUNTER (OUTPATIENT)
Dept: RADIATION ONCOLOGY | Facility: HOSPITAL | Age: 64
Discharge: HOME | End: 2019-05-13

## 2019-05-13 PROCEDURE — 77412 RADIATION TX DELIVERY LVL 3: CPT | Performed by: RADIOLOGY

## 2019-05-13 PROCEDURE — 77387 GUIDANCE FOR RADJ TX DLVR: CPT | Performed by: RADIOLOGY

## 2019-05-14 ENCOUNTER — HOSPITAL ENCOUNTER (OUTPATIENT)
Dept: RADIATION ONCOLOGY | Facility: HOSPITAL | Age: 64
Discharge: HOME | End: 2019-05-14

## 2019-05-14 VITALS — WEIGHT: 158.6 LBS

## 2019-05-14 PROCEDURE — 77412 RADIATION TX DELIVERY LVL 3: CPT | Performed by: RADIOLOGY

## 2019-05-14 PROCEDURE — 77387 GUIDANCE FOR RADJ TX DLVR: CPT | Performed by: RADIOLOGY

## 2019-05-15 ENCOUNTER — HOSPITAL ENCOUNTER (OUTPATIENT)
Dept: RADIATION ONCOLOGY | Facility: HOSPITAL | Age: 64
Discharge: HOME | End: 2019-05-15

## 2019-05-15 PROCEDURE — 77417 THER RADIOLOGY PORT IMAGE(S): CPT | Performed by: RADIOLOGY

## 2019-05-15 PROCEDURE — 77412 RADIATION TX DELIVERY LVL 3: CPT | Performed by: RADIOLOGY

## 2019-05-15 PROCEDURE — 77387 GUIDANCE FOR RADJ TX DLVR: CPT | Performed by: RADIOLOGY

## 2019-05-16 ENCOUNTER — HOSPITAL ENCOUNTER (OUTPATIENT)
Dept: BONE DENSITY | Facility: HOSPITAL | Age: 64
Discharge: HOME | End: 2019-05-16
Admitting: INTERNAL MEDICINE

## 2019-05-16 ENCOUNTER — HOSPITAL ENCOUNTER (OUTPATIENT)
Dept: RADIATION ONCOLOGY | Facility: HOSPITAL | Age: 64
Discharge: HOME | End: 2019-05-16

## 2019-05-16 DIAGNOSIS — C50.212 MALIGNANT NEOPLASM OF UPPER-INNER QUADRANT OF LEFT BREAST IN FEMALE, ESTROGEN RECEPTOR POSITIVE (HCC): ICD-10-CM

## 2019-05-16 DIAGNOSIS — Z17.0 MALIGNANT NEOPLASM OF UPPER-INNER QUADRANT OF LEFT BREAST IN FEMALE, ESTROGEN RECEPTOR POSITIVE (HCC): ICD-10-CM

## 2019-05-16 PROCEDURE — 77336 RADIATION PHYSICS CONSULT: CPT | Performed by: RADIOLOGY

## 2019-05-16 PROCEDURE — 77387 GUIDANCE FOR RADJ TX DLVR: CPT | Performed by: RADIOLOGY

## 2019-05-16 PROCEDURE — 77412 RADIATION TX DELIVERY LVL 3: CPT | Performed by: RADIOLOGY

## 2019-05-16 PROCEDURE — 77080 DXA BONE DENSITY AXIAL: CPT

## 2019-05-17 ENCOUNTER — HOSPITAL ENCOUNTER (OUTPATIENT)
Dept: RADIATION ONCOLOGY | Facility: HOSPITAL | Age: 64
Discharge: HOME | End: 2019-05-17

## 2019-05-17 PROCEDURE — 77387 GUIDANCE FOR RADJ TX DLVR: CPT | Performed by: RADIOLOGY

## 2019-05-17 PROCEDURE — 77412 RADIATION TX DELIVERY LVL 3: CPT | Performed by: RADIOLOGY

## 2019-05-20 ENCOUNTER — HOSPITAL ENCOUNTER (OUTPATIENT)
Dept: RADIATION ONCOLOGY | Facility: HOSPITAL | Age: 64
Discharge: HOME | End: 2019-05-20

## 2019-05-20 PROCEDURE — 77412 RADIATION TX DELIVERY LVL 3: CPT | Performed by: RADIOLOGY

## 2019-05-20 PROCEDURE — 77387 GUIDANCE FOR RADJ TX DLVR: CPT | Performed by: RADIOLOGY

## 2019-05-21 ENCOUNTER — HOSPITAL ENCOUNTER (OUTPATIENT)
Dept: RADIATION ONCOLOGY | Facility: HOSPITAL | Age: 64
Discharge: HOME | End: 2019-05-21

## 2019-05-21 VITALS — WEIGHT: 158.5 LBS

## 2019-05-21 PROCEDURE — 77412 RADIATION TX DELIVERY LVL 3: CPT | Performed by: RADIOLOGY

## 2019-05-21 PROCEDURE — 77387 GUIDANCE FOR RADJ TX DLVR: CPT | Performed by: RADIOLOGY

## 2019-05-22 ENCOUNTER — HOSPITAL ENCOUNTER (OUTPATIENT)
Dept: RADIATION ONCOLOGY | Facility: HOSPITAL | Age: 64
Discharge: HOME | End: 2019-05-22

## 2019-05-22 PROCEDURE — 77387 GUIDANCE FOR RADJ TX DLVR: CPT | Performed by: RADIOLOGY

## 2019-05-22 PROCEDURE — 77412 RADIATION TX DELIVERY LVL 3: CPT | Performed by: RADIOLOGY

## 2019-05-23 ENCOUNTER — HOSPITAL ENCOUNTER (OUTPATIENT)
Dept: RADIATION ONCOLOGY | Facility: HOSPITAL | Age: 64
Discharge: HOME | End: 2019-05-23

## 2019-05-23 PROCEDURE — 77412 RADIATION TX DELIVERY LVL 3: CPT | Performed by: RADIOLOGY

## 2019-05-23 PROCEDURE — 77387 GUIDANCE FOR RADJ TX DLVR: CPT | Performed by: RADIOLOGY

## 2019-05-24 ENCOUNTER — HOSPITAL ENCOUNTER (OUTPATIENT)
Dept: RADIATION ONCOLOGY | Facility: HOSPITAL | Age: 64
Discharge: HOME | End: 2019-05-24

## 2019-05-24 PROCEDURE — 77387 GUIDANCE FOR RADJ TX DLVR: CPT | Performed by: RADIOLOGY

## 2019-05-24 PROCEDURE — 77336 RADIATION PHYSICS CONSULT: CPT | Performed by: RADIOLOGY

## 2019-05-24 PROCEDURE — 77412 RADIATION TX DELIVERY LVL 3: CPT | Performed by: RADIOLOGY

## 2019-05-28 ENCOUNTER — HOSPITAL ENCOUNTER (OUTPATIENT)
Dept: RADIATION ONCOLOGY | Facility: HOSPITAL | Age: 64
Discharge: HOME | End: 2019-05-28

## 2019-05-28 VITALS — WEIGHT: 156.3 LBS

## 2019-05-28 PROCEDURE — 77412 RADIATION TX DELIVERY LVL 3: CPT | Performed by: RADIOLOGY

## 2019-05-28 PROCEDURE — 77387 GUIDANCE FOR RADJ TX DLVR: CPT | Performed by: RADIOLOGY

## 2019-05-29 ENCOUNTER — HOSPITAL ENCOUNTER (OUTPATIENT)
Dept: RADIATION ONCOLOGY | Facility: HOSPITAL | Age: 64
Discharge: HOME | End: 2019-05-29

## 2019-05-29 PROCEDURE — 77412 RADIATION TX DELIVERY LVL 3: CPT | Performed by: RADIOLOGY

## 2019-05-29 PROCEDURE — 77387 GUIDANCE FOR RADJ TX DLVR: CPT | Performed by: RADIOLOGY

## 2019-05-30 ENCOUNTER — HOSPITAL ENCOUNTER (OUTPATIENT)
Dept: RADIATION ONCOLOGY | Facility: HOSPITAL | Age: 64
Discharge: HOME | End: 2019-05-30

## 2019-05-30 PROCEDURE — 77412 RADIATION TX DELIVERY LVL 3: CPT | Performed by: RADIOLOGY

## 2019-05-30 PROCEDURE — 77387 GUIDANCE FOR RADJ TX DLVR: CPT | Performed by: RADIOLOGY

## 2019-05-31 ENCOUNTER — TELEPHONE (OUTPATIENT)
Dept: ONCOLOGY | Facility: CLINIC | Age: 64
End: 2019-05-31

## 2019-05-31 NOTE — TELEPHONE ENCOUNTER
----- Message from Teodoro Hawkins sent at 5/30/2019  1:07 PM EDT -----  Regarding: amish- medication  Contact: [phone]  Pt  Called  Left voice msg  Wanting to know about  Taking  Her pill before radiation wanted nurse to give her a call back

## 2019-05-31 NOTE — TELEPHONE ENCOUNTER
Returned call to patient Patient can start anastrozole when radiation is completed.  Patient reports that radiation is finished.  Patient can start anastrozole. Patient verbalized understanding.

## 2019-07-12 ENCOUNTER — TRANSCRIBE ORDERS (OUTPATIENT)
Dept: MAMMOGRAPHY | Facility: HOSPITAL | Age: 64
End: 2019-07-12

## 2019-07-12 DIAGNOSIS — C50.212 MALIGNANT NEOPLASM OF UPPER-INNER QUADRANT OF LEFT FEMALE BREAST, UNSPECIFIED ESTROGEN RECEPTOR STATUS (HCC): Primary | ICD-10-CM

## 2019-07-18 ENCOUNTER — OFFICE VISIT (OUTPATIENT)
Dept: RADIATION ONCOLOGY | Facility: HOSPITAL | Age: 64
End: 2019-07-18

## 2019-07-18 ENCOUNTER — HOSPITAL ENCOUNTER (OUTPATIENT)
Dept: RADIATION ONCOLOGY | Facility: HOSPITAL | Age: 64
Setting detail: RADIATION/ONCOLOGY SERIES
Discharge: HOME | End: 2019-07-18

## 2019-07-18 VITALS
WEIGHT: 163.7 LBS | OXYGEN SATURATION: 94 % | SYSTOLIC BLOOD PRESSURE: 127 MMHG | RESPIRATION RATE: 15 BRPM | BODY MASS INDEX: 27.95 KG/M2 | HEART RATE: 68 BPM | TEMPERATURE: 98.3 F | HEIGHT: 64 IN | DIASTOLIC BLOOD PRESSURE: 76 MMHG

## 2019-07-18 DIAGNOSIS — Z85.3 HISTORY OF BREAST CANCER: Primary | ICD-10-CM

## 2019-07-18 PROCEDURE — G0463 HOSPITAL OUTPT CLINIC VISIT: HCPCS

## 2019-07-18 NOTE — PROGRESS NOTES
FOLLOW UP NOTE    PATIENT:                                                      Gloria Alvarado  MEDICAL RECORD #:                        [id number]  :                                                          1955  COMPLETION DATE:   2019  DIAGNOSIS:     Cancer Staging  Malignant neoplasm of upper-inner quadrant of left breast in female, estrogen receptor positive (CMS/HCC)  Staging form: Breast, AJCC 8th Edition  - Clinical: cT1b - Unsigned  - Pathologic stage from 4/3/2019: Stage IA (pT1b, pN0(sn), cM0, G1, ER: Positive, RI: Positive, HER2: Negative) - Signed by Lorri Orantes MD on 4/3/2019        BRIEF HISTORY:    Gloria Alvarado  is a very pleasant 63 y.o. female  who had an abnormality in the upper outer quadrant of the left breast.  Biopsy was positive for low-grade infiltrating ductal carcinoma.  On MRI she had an area of concern in the right breast as well but biopsy was negative for carcinoma.  She underwent left breast lumpectomy and was found to have a 1 cm, low-grade, ER RI positive tumor with negative extended margins.  She received radiotherapy to the left breast of 40.05 Gy in 15 fractions completing 2019.  She had fluid drained from the left axilla by Dr. Lim. Today she returns with no complaints.         MEDICATIONS: Medication reconciliation for the patient was reviewed and confirmed in the electronic medical record.    Review of Systems   All other systems reviewed and are negative.      KPS 90%    Physical Exam   Constitutional: She is oriented to person, place, and time. She appears well-developed and well-nourished. No distress.   HENT:   Head: Normocephalic and atraumatic.   Eyes: EOM are normal. No scleral icterus.   Neck: Neck supple.   Cardiovascular: Normal rate and regular rhythm.   Pulmonary/Chest: Effort normal and breath sounds normal.   Lymphadenopathy:     She has no cervical adenopathy.   Neurological: She is alert and oriented to person, place, and  "time.   Nursing note and vitals reviewed.  The skin of the treated breast is mildly hyperpigmented.  She has no axillary adenopathy and there is no fluid buildup.    VITAL SIGNS:   Vitals:    07/18/19 1457   BP: 127/76   Pulse: 68   Resp: 15   Temp: 98.3 °F (36.8 °C)   TempSrc: Temporal   SpO2: 94%  Comment: RA   Weight: 74.3 kg (163 lb 11.2 oz)   Height: 162.6 cm (64\")   PainSc: 0-No pain       The following portions of the patient's history were reviewed and updated as appropriate: allergies, current medications, past family history, past medical history, past social history, past surgical history and problem list.         Gloria was seen today for breast cancer.    Diagnoses and all orders for this visit:    History of breast cancer         IMPRESSION: Gloria Alvarado underwent left breast lumpectomy for a 1 cm, low-grade, ER GA positive tumor with negative extended margins.  She received radiotherapy to the left breast of 40.05 Gy in 15 fractions completing 05/30/2019.  She had fluid drained from the left axilla by Dr. WHITE. Today she returns with no complaints.      RECOMMENDATIONS: Ms. Alvarado has follow-up scheduled with Dr. White.  We will see her back in our department as needed.  I recommended Aquaphor to the skin.  It has been a pleasure to participate in her care.  Return for PRN.    Lorri Orantes MD    Errors in dictation may reflect use of voice recognition software and not all errors in transcription may have been detected prior to signing.  "

## 2019-07-23 ENCOUNTER — OFFICE VISIT (OUTPATIENT)
Dept: ONCOLOGY | Facility: CLINIC | Age: 64
End: 2019-07-23

## 2019-07-23 VITALS
TEMPERATURE: 97.2 F | WEIGHT: 162 LBS | HEART RATE: 68 BPM | BODY MASS INDEX: 27.66 KG/M2 | OXYGEN SATURATION: 97 % | SYSTOLIC BLOOD PRESSURE: 118 MMHG | RESPIRATION RATE: 20 BRPM | HEIGHT: 64 IN | DIASTOLIC BLOOD PRESSURE: 80 MMHG

## 2019-07-23 DIAGNOSIS — C50.212 MALIGNANT NEOPLASM OF UPPER-INNER QUADRANT OF LEFT BREAST IN FEMALE, ESTROGEN RECEPTOR POSITIVE (HCC): Primary | ICD-10-CM

## 2019-07-23 DIAGNOSIS — Z17.0 MALIGNANT NEOPLASM OF UPPER-INNER QUADRANT OF LEFT BREAST IN FEMALE, ESTROGEN RECEPTOR POSITIVE (HCC): Primary | ICD-10-CM

## 2019-07-23 PROCEDURE — 99214 OFFICE O/P EST MOD 30 MIN: CPT | Performed by: NURSE PRACTITIONER

## 2019-07-23 RX ORDER — SODIUM PHOSPHATE,MONO-DIBASIC 19G-7G/118
ENEMA (ML) RECTAL
COMMUNITY
End: 2023-02-07

## 2019-07-23 NOTE — PROGRESS NOTES
"      PROBLEM LIST:  1. [id number] (Stage IA) ER+ [place]+ Her2 negative invasive ductal carcinoma of the left breast  A) left lumpectomy on 3/29/19 showed a 1.0 cm low grade IDC.  0/3 SLN involved  B) She completed left breast radiation 5/30/2019.   C) Started endocrine therapy with anastrozole 6/3/2019.   2. Anxiety/depression  3. Hypertension  4. arthritis          Subjective     CHIEF COMPLAINT: breast cancer management     HISTORY OF PRESENT ILLNESS:   Gloria Alvarado returns for follow-up.  She completed left breast radiation 5/30/2019 and tolerated it well. She started endocrine therapy with anastrozole 6/3/2019. She is having mild hot flashes and night sweats and some mood irritability.       Past Medical History, Past Surgical History, Social History, Family History have been reviewed and are without significant changes except as mentioned.    Review of Systems   A comprehensive 14 point review of systems was performed and was negative except as mentioned.    Medications:  The current medication list was reviewed in the EMR    ALLERGIES:    Allergies   Allergen Reactions   • Peanut-Containing Drug Products Shortness Of Breath       Objective      /80   Pulse 68   Temp 97.2 °F (36.2 °C) (Temporal)   Resp 20   Ht 162.6 cm (64\")   Wt 73.5 kg (162 lb)   SpO2 97%   BMI 27.81 kg/m²      Performance Status: 0    General: well appearing female in no acute distress  Neuro: alert and oriented  HEENT: sclera anicteric, oropharynx clear  Lymphatics: no cervical, supraclavicular, or axillary adenopathy  Cardiovascular: regular rate and rhythm, no murmurs  Lungs: clear to auscultation bilaterally  Breasts: left breast with well healed lumpectomy incision. No masses or skin changes. Right breast not examined.   Abdomen: soft, nontender, nondistended.  No palpable organomegaly  Extremeties: no lower extremity edema  Skin: no rashes, lesions, bruising, or petechiae  Psych: mood and affect appropriate        Bone " Density Results: 5/16/2019  IMPRESSION:  Bone mineral density results are within normal limits.      Assessment/Plan   Gloria Alvarado is a 63 y.o. year old female  with a stage IA ER+ Her2 negative left breast cancer. She completed left breast radiation 5/30/2019. She started adjuvant endocrine therapy with Anastrozole Tequila 3,  2019.    Diagnostic mammogram already scheduled for November 27, 2019.      She will follow-up in 6 months.                I spent 30 minutes with the patient. I spent > 50% percent of this time counseling and discussing prognosis, diagnostic testing, evaluation, current status and management.        Makenzie Groves, SERGIO  [facility] Hematology and Oncology    7/23/2019          CC:

## 2019-11-27 ENCOUNTER — HOSPITAL ENCOUNTER (OUTPATIENT)
Dept: MAMMOGRAPHY | Facility: HOSPITAL | Age: 64
Discharge: HOME | End: 2019-11-27
Admitting: SURGERY

## 2019-11-27 DIAGNOSIS — C50.212 MALIGNANT NEOPLASM OF UPPER-INNER QUADRANT OF LEFT FEMALE BREAST, UNSPECIFIED ESTROGEN RECEPTOR STATUS (HCC): ICD-10-CM

## 2019-11-27 PROCEDURE — 77066 DX MAMMO INCL CAD BI: CPT

## 2019-11-27 PROCEDURE — 77066 DX MAMMO INCL CAD BI: CPT | Performed by: RADIOLOGY

## 2019-11-27 PROCEDURE — G0279 TOMOSYNTHESIS, MAMMO: HCPCS

## 2019-11-27 PROCEDURE — 77062 BREAST TOMOSYNTHESIS BI: CPT | Performed by: RADIOLOGY

## 2020-01-23 ENCOUNTER — OFFICE VISIT (OUTPATIENT)
Dept: ONCOLOGY | Facility: CLINIC | Age: 65
End: 2020-01-23

## 2020-01-23 VITALS
HEIGHT: 64 IN | SYSTOLIC BLOOD PRESSURE: 153 MMHG | HEART RATE: 74 BPM | DIASTOLIC BLOOD PRESSURE: 88 MMHG | WEIGHT: 179 LBS | TEMPERATURE: 98.1 F | OXYGEN SATURATION: 98 % | BODY MASS INDEX: 30.56 KG/M2 | RESPIRATION RATE: 18 BRPM

## 2020-01-23 DIAGNOSIS — C50.212 MALIGNANT NEOPLASM OF UPPER-INNER QUADRANT OF LEFT BREAST IN FEMALE, ESTROGEN RECEPTOR POSITIVE (HCC): Primary | ICD-10-CM

## 2020-01-23 DIAGNOSIS — Z17.0 MALIGNANT NEOPLASM OF UPPER-INNER QUADRANT OF LEFT BREAST IN FEMALE, ESTROGEN RECEPTOR POSITIVE (HCC): Primary | ICD-10-CM

## 2020-01-23 PROCEDURE — 99213 OFFICE O/P EST LOW 20 MIN: CPT | Performed by: INTERNAL MEDICINE

## 2020-01-23 NOTE — PROGRESS NOTES
"      PROBLEM LIST:  1. [id number] (Stage IA) ER+ CT+ Her2 negative invasive ductal carcinoma of the left breast  A) left lumpectomy on 3/29/19 showed a 1.0 cm low grade IDC.  0/3 SLN involved  B) She completed left breast radiation 2019.   C) Started endocrine therapy with anastrozole 6/3/2019.   2. Anxiety/depression  3. Hypertension  4. arthritis          Subjective     CHIEF COMPLAINT: breast cancer management     HISTORY OF PRESENT ILLNESS:   Gloria Alvarado returns for follow-up.  Her brother passed away this morning from pancreatic cancer.  She says it was expected.  She is sad because she was not there when he .  However she is glad that his pain is over.  She continues to take anastrozole.  She says that she does have increased joint pain with it.  She is not exercising regularly.  She used to walk but since she developed a torn meniscus in her knee it is painful to walk.      Past Medical History, Past Surgical History, Social History, Family History have been reviewed and are without significant changes except as mentioned.    Review of Systems   A comprehensive 14 point review of systems was performed and was negative except as mentioned.    Medications:  The current medication list was reviewed in the EMR    ALLERGIES:    Allergies   Allergen Reactions   • Peanut-Containing Drug Products Shortness Of Breath       Objective      /88 Comment: RUE  Pulse 74   Temp 98.1 °F (36.7 °C) (Temporal)   Resp 18   Ht 162.6 cm (64\")   Wt 81.2 kg (179 lb)   SpO2 98% Comment: RA  BMI 30.73 kg/m²      Performance Status: 0    General: well appearing female in no acute distress  Neuro: alert and oriented  HEENT: sclera anicteric, oropharynx clear  Lymphatics: no cervical, supraclavicular, or axillary adenopathy  Cardiovascular: regular rate and rhythm, no murmurs  Lungs: clear to auscultation bilaterally  Breasts: left breast with well healed lumpectomy incision.  She has some lymphedema of the breast " with no palpable mass or erythema.  Abdomen: soft, nontender, nondistended.  No palpable organomegaly  Extremeties: no lower extremity edema  Skin: no rashes, lesions, bruising, or petechiae  Psych: mood and affect appropriate              Assessment/Plan   Gloria Alvarado is a 64 y.o. year old female  with a stage IA ER+ Her2 negative left breast cancer. She completed left breast radiation 5/30/2019. She started adjuvant endocrine therapy with Anastrozole Tequila 3,  2019.  She is doing well at this point with no evidence of disease recurrence.  She appears to have some lymphedema in the breast.  She says it is sensitive and feels heavy.  She is self-conscious about it.  I will refer her to occupational therapy for this.    She will need a repeat dexa scan May 2021.      She will follow-up in 6 months.                I spent 15 minutes with the patient. I spent > 50% percent of this time counseling and discussing prognosis, diagnostic testing, evaluation, current status and management.        Nafisa Emmanuel MD  [facility] Hematology and Oncology    1/23/2020          CC:

## 2020-04-06 RX ORDER — ANASTROZOLE 1 MG/1
TABLET ORAL
Qty: 30 TABLET | Refills: 11 | Status: SHIPPED | OUTPATIENT
Start: 2020-04-06 | End: 2021-03-30

## 2020-04-15 ENCOUNTER — TRANSCRIBE ORDERS (OUTPATIENT)
Dept: ADMINISTRATIVE | Facility: HOSPITAL | Age: 65
End: 2020-04-15

## 2020-04-15 DIAGNOSIS — R92.8 FOLLOW-UP EXAMINATION OF ABNORMAL MAMMOGRAM: Primary | ICD-10-CM

## 2020-04-15 DIAGNOSIS — R92.8 ABNORMAL MAMMOGRAM: Primary | ICD-10-CM

## 2020-05-29 ENCOUNTER — APPOINTMENT (OUTPATIENT)
Dept: MAMMOGRAPHY | Facility: HOSPITAL | Age: 65
End: 2020-05-29

## 2020-05-29 ENCOUNTER — HOSPITAL ENCOUNTER (OUTPATIENT)
Dept: MAMMOGRAPHY | Facility: HOSPITAL | Age: 65
Discharge: HOME | End: 2020-05-29
Admitting: SURGERY

## 2020-05-29 DIAGNOSIS — R92.8 ABNORMAL MAMMOGRAM: ICD-10-CM

## 2020-05-29 PROCEDURE — 77065 DX MAMMO INCL CAD UNI: CPT

## 2020-05-29 PROCEDURE — 77065 DX MAMMO INCL CAD UNI: CPT | Performed by: RADIOLOGY

## 2020-06-05 ENCOUNTER — TRANSCRIBE ORDERS (OUTPATIENT)
Dept: ADMINISTRATIVE | Facility: HOSPITAL | Age: 65
End: 2020-06-05

## 2020-06-05 ENCOUNTER — HOSPITAL ENCOUNTER (OUTPATIENT)
Dept: GENERAL RADIOLOGY | Facility: HOSPITAL | Age: 65
Discharge: HOME | End: 2020-06-05
Admitting: NURSE PRACTITIONER

## 2020-06-05 DIAGNOSIS — M25.571 ACUTE RIGHT ANKLE PAIN: Primary | ICD-10-CM

## 2020-06-05 PROCEDURE — 73610 X-RAY EXAM OF ANKLE: CPT

## 2020-07-23 ENCOUNTER — OFFICE VISIT (OUTPATIENT)
Dept: ONCOLOGY | Facility: CLINIC | Age: 65
End: 2020-07-23

## 2020-07-23 VITALS
OXYGEN SATURATION: 98 % | HEIGHT: 64 IN | DIASTOLIC BLOOD PRESSURE: 89 MMHG | RESPIRATION RATE: 16 BRPM | WEIGHT: 186 LBS | TEMPERATURE: 98.2 F | SYSTOLIC BLOOD PRESSURE: 126 MMHG | HEART RATE: 69 BPM | BODY MASS INDEX: 31.76 KG/M2

## 2020-07-23 DIAGNOSIS — Z17.0 MALIGNANT NEOPLASM OF UPPER-INNER QUADRANT OF LEFT BREAST IN FEMALE, ESTROGEN RECEPTOR POSITIVE (HCC): Primary | ICD-10-CM

## 2020-07-23 DIAGNOSIS — C50.212 MALIGNANT NEOPLASM OF UPPER-INNER QUADRANT OF LEFT BREAST IN FEMALE, ESTROGEN RECEPTOR POSITIVE (HCC): Primary | ICD-10-CM

## 2020-07-23 PROCEDURE — 99213 OFFICE O/P EST LOW 20 MIN: CPT | Performed by: NURSE PRACTITIONER

## 2020-07-23 NOTE — PROGRESS NOTES
"      PROBLEM LIST:  1. [id number] (Stage IA) ER+ [place]+ Her2 negative invasive ductal carcinoma of the left breast  A) left lumpectomy on 3/29/19 showed a 1.0 cm low grade IDC.  0/3 SLN involved  B) She completed left breast radiation 5/30/2019.   C) Started endocrine therapy with anastrozole 6/3/2019.   2. Anxiety/depression  3. Hypertension  4. arthritis          Subjective     CHIEF COMPLAINT: breast cancer management     HISTORY OF PRESENT ILLNESS:   Gloria Alvarado returns for follow-up.  She continues to take anastrozole.  She still has hot flashes but they are not as severe as they used to be.  She also has occasional joint pain particularly in her knees.  She did have an injection in her right knee about a month and a half ago and said it feels much better.  She retired July 2 and is very happy with her new ability to travel frequently  to see her mother and sisters.      Past Medical History, Past Surgical History, Social History, Family History have been reviewed and are without significant changes except as mentioned.    Review of Systems   A comprehensive 14 point review of systems was performed and was negative except as mentioned.    Medications:  The current medication list was reviewed in the EMR    ALLERGIES:    Allergies   Allergen Reactions   • Peanut-Containing Drug Products Shortness Of Breath       Objective      /89   Pulse 69   Temp 98.2 °F (36.8 °C) (Temporal)   Resp 16   Ht 162.6 cm (64.02\")   Wt 84.4 kg (186 lb)   SpO2 98%   BMI 31.91 kg/m²    Vitals:    07/23/20 1047   PainSc: 0-No pain             Performance Status: 0    General: well appearing female in no acute distress  Neuro: alert and oriented  HEENT: sclera anicteric, oropharynx clear  Lymphatics: no cervical, supraclavicular, or axillary adenopathy  Cardiovascular: regular rate and rhythm, no murmurs  Lungs: clear to auscultation bilaterally  Breasts: left breast with well healed lumpectomy incision.  She has some mild " lymphedema of the left breast with no palpable mass or erythema. Right breast with no masses, skin changes or discharge   Abdomen: soft, nontender, nondistended.  No palpable organomegaly  Extremeties: no lower extremity edema  Skin: no rashes, lesions, bruising, or petechiae  Psych: mood and affect appropriate      5/29/2020 LEFT DIGITAL DIAGNOSTIC MAMMOGRAM:   ACR BI-RADS CATEGORY:  3, PROBABLY BENIGN    RECOMMENDATION:  Six-month short-term interval follow-up according to  the lumpectomy protocol.      Assessment/Plan   Gloria Alvarado is a 64 y.o. year old female  with a stage IA ER+ Her2 negative left breast cancer. She completed adjuvant radiation.  She continues on anastrozole and is tolerating it well.    We will continue the anastrozole for a minimum of 5 years.    Bilateral diagnostic mammogram due the end of November 2020.     She will need a repeat dexa scan May 2021.      She will follow-up in 6 months.                I spent 15 minutes with the patient. I spent > 50% percent of this time counseling and discussing prognosis, diagnostic testing, evaluation, current status and management.        Makenzie Groves, SERGIO  [facility] Hematology and Oncology    7/23/2020          CC:

## 2020-11-04 ENCOUNTER — HOSPITAL ENCOUNTER (OUTPATIENT)
Dept: MAMMOGRAPHY | Facility: HOSPITAL | Age: 65
Discharge: HOME | End: 2020-11-04
Admitting: NURSE PRACTITIONER

## 2020-11-04 DIAGNOSIS — C50.212 MALIGNANT NEOPLASM OF UPPER-INNER QUADRANT OF LEFT BREAST IN FEMALE, ESTROGEN RECEPTOR POSITIVE (HCC): ICD-10-CM

## 2020-11-04 DIAGNOSIS — Z17.0 MALIGNANT NEOPLASM OF UPPER-INNER QUADRANT OF LEFT BREAST IN FEMALE, ESTROGEN RECEPTOR POSITIVE (HCC): ICD-10-CM

## 2020-11-04 PROCEDURE — 77062 BREAST TOMOSYNTHESIS BI: CPT | Performed by: RADIOLOGY

## 2020-11-04 PROCEDURE — 77066 DX MAMMO INCL CAD BI: CPT | Performed by: RADIOLOGY

## 2020-11-04 PROCEDURE — G0279 TOMOSYNTHESIS, MAMMO: HCPCS

## 2020-11-04 PROCEDURE — 77066 DX MAMMO INCL CAD BI: CPT

## 2021-01-28 ENCOUNTER — OFFICE VISIT (OUTPATIENT)
Dept: ONCOLOGY | Facility: CLINIC | Age: 66
End: 2021-01-28

## 2021-01-28 VITALS
SYSTOLIC BLOOD PRESSURE: 128 MMHG | DIASTOLIC BLOOD PRESSURE: 85 MMHG | HEIGHT: 64 IN | OXYGEN SATURATION: 100 % | WEIGHT: 186.3 LBS | RESPIRATION RATE: 18 BRPM | HEART RATE: 71 BPM | BODY MASS INDEX: 31.8 KG/M2 | TEMPERATURE: 96.9 F

## 2021-01-28 DIAGNOSIS — C50.212 MALIGNANT NEOPLASM OF UPPER-INNER QUADRANT OF LEFT BREAST IN FEMALE, ESTROGEN RECEPTOR POSITIVE (HCC): Primary | ICD-10-CM

## 2021-01-28 DIAGNOSIS — Z17.0 MALIGNANT NEOPLASM OF UPPER-INNER QUADRANT OF LEFT BREAST IN FEMALE, ESTROGEN RECEPTOR POSITIVE (HCC): Primary | ICD-10-CM

## 2021-01-28 PROCEDURE — 99213 OFFICE O/P EST LOW 20 MIN: CPT | Performed by: INTERNAL MEDICINE

## 2021-01-28 NOTE — PROGRESS NOTES
"      PROBLEM LIST:  1. [id number] (Stage IA) ER+ [place]+ Her2 negative invasive ductal carcinoma of the left breast  A) left lumpectomy on 3/29/19 showed a 1.0 cm low grade IDC.  0/3 SLN involved  B) She completed left breast radiation 5/30/2019.   C) Started endocrine therapy with anastrozole 6/3/2019.   2. Anxiety/depression  3. Hypertension  4. arthritis          Subjective     CHIEF COMPLAINT: breast cancer management     HISTORY OF PRESENT ILLNESS:   Gloria Alvarado returns for follow-up.  She continues to take anastrozole.  She is tolerating this pretty well.  No new complaints.  She had a mammogram in November.  She is taking Ca/Vit D but says the calcium makes her constipated.      Past Medical History, Past Surgical History, Social History, Family History have been reviewed and are without significant changes except as mentioned.    Review of Systems   A comprehensive 14 point review of systems was performed and was negative except as mentioned.    Medications:  The current medication list was reviewed in the EMR    ALLERGIES:    Allergies   Allergen Reactions   • Peanut-Containing Drug Products Shortness Of Breath       Objective      /85   Pulse 71   Temp 96.9 °F (36.1 °C) (Temporal)   Resp 18   Ht 162.6 cm (64\")   Wt 84.5 kg (186 lb 4.8 oz)   SpO2 100%   BMI 31.98 kg/m²    Vitals:    01/28/21 1058   PainSc:   5   PainLoc: Knee             Performance Status: 0    General: well appearing female in no acute distress  Neuro: alert and oriented  HEENT: sclera anicteric, oropharynx clear  Lymphatics: no cervical, supraclavicular, or axillary adenopathy  Cardiovascular: regular rate and rhythm, no murmurs  Lungs: clear to auscultation bilaterally  Breasts: left breast with well healed lumpectomy incision. very mild edema in lower breast. Right breast with no masses, skin changes or discharge   Abdomen: soft, nontender, nondistended.  No palpable organomegaly  Extremeties: no lower extremity " edema  Skin: no rashes, lesions, bruising, or petechiae  Psych: mood and affect appropriate        Assessment/Plan   Gloria Alvarado is a 65 y.o. year old female  with a stage IA ER+ Her2 negative left breast cancer.   She continues on anastrozole and is tolerating it well.    We will continue the anastrozole for a minimum of 5 years.    Bilateral diagnostic mammogram due November 2021 - ordered today.     She will need a repeat dexa scan May 2021 - ordered today.  Discussed that she can stop taking calcium and just continue vitamin d due to constipation.     She will follow-up in 6 months.                  Nafisa Emmanuel MD  [facility] Hematology and Oncology    1/28/2021          CC:

## 2021-03-30 DIAGNOSIS — Z17.0 MALIGNANT NEOPLASM OF UPPER-INNER QUADRANT OF LEFT BREAST IN FEMALE, ESTROGEN RECEPTOR POSITIVE (HCC): Primary | ICD-10-CM

## 2021-03-30 DIAGNOSIS — C50.212 MALIGNANT NEOPLASM OF UPPER-INNER QUADRANT OF LEFT BREAST IN FEMALE, ESTROGEN RECEPTOR POSITIVE (HCC): Primary | ICD-10-CM

## 2021-03-30 RX ORDER — ANASTROZOLE 1 MG/1
TABLET ORAL
Qty: 30 TABLET | Refills: 11 | Status: SHIPPED | OUTPATIENT
Start: 2021-03-30 | End: 2022-02-02 | Stop reason: SDUPTHER

## 2021-05-18 ENCOUNTER — HOSPITAL ENCOUNTER (OUTPATIENT)
Dept: BONE DENSITY | Facility: HOSPITAL | Age: 66
Discharge: HOME | End: 2021-05-18
Admitting: INTERNAL MEDICINE

## 2021-05-18 DIAGNOSIS — C50.212 MALIGNANT NEOPLASM OF UPPER-INNER QUADRANT OF LEFT BREAST IN FEMALE, ESTROGEN RECEPTOR POSITIVE (HCC): ICD-10-CM

## 2021-05-18 DIAGNOSIS — Z17.0 MALIGNANT NEOPLASM OF UPPER-INNER QUADRANT OF LEFT BREAST IN FEMALE, ESTROGEN RECEPTOR POSITIVE (HCC): ICD-10-CM

## 2021-05-18 PROCEDURE — 77080 DXA BONE DENSITY AXIAL: CPT

## 2021-07-30 ENCOUNTER — OFFICE VISIT (OUTPATIENT)
Dept: ONCOLOGY | Facility: CLINIC | Age: 66
End: 2021-07-30

## 2021-07-30 VITALS
HEART RATE: 67 BPM | WEIGHT: 185 LBS | TEMPERATURE: 97.5 F | RESPIRATION RATE: 18 BRPM | HEIGHT: 64 IN | OXYGEN SATURATION: 99 % | SYSTOLIC BLOOD PRESSURE: 147 MMHG | DIASTOLIC BLOOD PRESSURE: 92 MMHG | BODY MASS INDEX: 31.58 KG/M2

## 2021-07-30 DIAGNOSIS — Z17.0 MALIGNANT NEOPLASM OF UPPER-INNER QUADRANT OF LEFT BREAST IN FEMALE, ESTROGEN RECEPTOR POSITIVE (HCC): Primary | ICD-10-CM

## 2021-07-30 DIAGNOSIS — C50.212 MALIGNANT NEOPLASM OF UPPER-INNER QUADRANT OF LEFT BREAST IN FEMALE, ESTROGEN RECEPTOR POSITIVE (HCC): Primary | ICD-10-CM

## 2021-07-30 PROCEDURE — 99214 OFFICE O/P EST MOD 30 MIN: CPT | Performed by: NURSE PRACTITIONER

## 2021-07-30 NOTE — PROGRESS NOTES
"      PROBLEM LIST:  1. [id number] (Stage IA) ER+ ID+ Her2 negative invasive ductal carcinoma of the left breast  A) left lumpectomy on 3/29/19 showed a 1.0 cm low grade IDC.  0/3 SLN involved  B) She completed left breast radiation 5/30/2019.   C) Started endocrine therapy with anastrozole 6/3/2019.   2. Anxiety/depression  3. Hypertension  4. arthritis          Subjective     CHIEF COMPLAINT: breast cancer management     HISTORY OF PRESENT ILLNESS:   Gloria Alvarado returns for follow-up.  She continues to take anastrozole.  She is tolerating it relatively well.  It does cause increased joint pain particularly in her knees.  She denies any other complaints.  No long bone pain, cough, dyspnea, headaches or diplopia.  She frequently is traveling to [facility] to help take care of her brother-in-law who is bedbound.      Past Medical History, Past Surgical History, Social History, Family History have been reviewed and are without significant changes except as mentioned.    Review of Systems   A comprehensive 14 point review of systems was performed and was negative except as mentioned.    Medications:  The current medication list was reviewed in the EMR    ALLERGIES:    Allergies   Allergen Reactions   • Peanut-Containing Drug Products Shortness Of Breath       Objective      /92   Pulse 67   Temp 97.5 °F (36.4 °C) (Temporal)   Resp 18   Ht 162.6 cm (64.02\")   Wt 83.9 kg (185 lb)   SpO2 99%   BMI 31.74 kg/m²    Vitals:    07/30/21 1118   PainSc: 0-No pain             Performance Status: 0    General: well appearing female in no acute distress  Neuro: alert and oriented  HEENT: sclera anicteric, oropharynx clear  Lymphatics: no cervical, supraclavicular, or axillary adenopathy  Cardiovascular: regular rate and rhythm, no murmurs  Lungs: clear to auscultation bilaterally  Breasts: Left breast with well-healed lumpectomy incision, mild radiation changes noted no masses or discharge.  Right breast with no " masses, skin changes or discharge  Abdomen: soft, nontender, nondistended.  No palpable organomegaly  Extremeties: no lower extremity edema  Skin: no rashes, lesions, bruising, or petechiae  Psych: mood and affect appropriate        Assessment/Plan   Gloria Alvarado is a 65 y.o. year old female  with a stage IA ER+ Her2 negative left breast cancer.   She continues on anastrozole and is tolerating it relatively well.    We will continue the anastrozole for a minimum of 5 years.    Bilateral diagnostic mammogram already scheduled for November 8, 2021     Normal bone density May 18, 2021.  Recommend repeat bone density May 2023.     Follow up in 6 months.            I spent 32 minutes caring for Gloria on this date of service. This time includes time spent by me in the following activities: preparing for the visit, reviewing tests, obtaining and/or reviewing a separately obtained history, performing a medically appropriate examination and/or evaluation, counseling and educating the patient/family/caregiver and referring and communicating with other health care professionals        Makenzie Groves, SERGIO  [facility] Hematology and Oncology    7/30/2021          CC:

## 2021-11-08 ENCOUNTER — HOSPITAL ENCOUNTER (OUTPATIENT)
Dept: MAMMOGRAPHY | Facility: HOSPITAL | Age: 66
Discharge: HOME | End: 2021-11-08
Admitting: INTERNAL MEDICINE

## 2021-11-08 DIAGNOSIS — Z17.0 MALIGNANT NEOPLASM OF UPPER-INNER QUADRANT OF LEFT BREAST IN FEMALE, ESTROGEN RECEPTOR POSITIVE (HCC): ICD-10-CM

## 2021-11-08 DIAGNOSIS — C50.212 MALIGNANT NEOPLASM OF UPPER-INNER QUADRANT OF LEFT BREAST IN FEMALE, ESTROGEN RECEPTOR POSITIVE (HCC): ICD-10-CM

## 2021-11-08 PROCEDURE — G0279 TOMOSYNTHESIS, MAMMO: HCPCS

## 2021-11-08 PROCEDURE — 77066 DX MAMMO INCL CAD BI: CPT

## 2021-11-08 PROCEDURE — G0279 TOMOSYNTHESIS, MAMMO: HCPCS | Performed by: RADIOLOGY

## 2021-11-08 PROCEDURE — 77066 DX MAMMO INCL CAD BI: CPT | Performed by: RADIOLOGY

## 2022-02-02 ENCOUNTER — OFFICE VISIT (OUTPATIENT)
Dept: ONCOLOGY | Facility: CLINIC | Age: 67
End: 2022-02-02

## 2022-02-02 ENCOUNTER — HOSPITAL ENCOUNTER (OUTPATIENT)
Dept: GENERAL RADIOLOGY | Facility: HOSPITAL | Age: 67
Discharge: HOME | End: 2022-02-02
Admitting: INTERNAL MEDICINE

## 2022-02-02 ENCOUNTER — TELEPHONE (OUTPATIENT)
Dept: ONCOLOGY | Facility: CLINIC | Age: 67
End: 2022-02-02

## 2022-02-02 VITALS
TEMPERATURE: 98.1 F | DIASTOLIC BLOOD PRESSURE: 91 MMHG | HEART RATE: 65 BPM | OXYGEN SATURATION: 99 % | SYSTOLIC BLOOD PRESSURE: 136 MMHG | HEIGHT: 64 IN | WEIGHT: 181 LBS | BODY MASS INDEX: 30.9 KG/M2 | RESPIRATION RATE: 18 BRPM

## 2022-02-02 DIAGNOSIS — Z17.0 MALIGNANT NEOPLASM OF UPPER-INNER QUADRANT OF LEFT BREAST IN FEMALE, ESTROGEN RECEPTOR POSITIVE: Primary | ICD-10-CM

## 2022-02-02 DIAGNOSIS — C50.212 MALIGNANT NEOPLASM OF UPPER-INNER QUADRANT OF LEFT BREAST IN FEMALE, ESTROGEN RECEPTOR POSITIVE: Primary | ICD-10-CM

## 2022-02-02 DIAGNOSIS — R06.00 DYSPNEA, UNSPECIFIED TYPE: ICD-10-CM

## 2022-02-02 PROCEDURE — 99214 OFFICE O/P EST MOD 30 MIN: CPT | Performed by: INTERNAL MEDICINE

## 2022-02-02 PROCEDURE — 71046 X-RAY EXAM CHEST 2 VIEWS: CPT

## 2022-02-02 RX ORDER — ANASTROZOLE 1 MG/1
1 TABLET ORAL DAILY
Qty: 30 TABLET | Refills: 11 | Status: SHIPPED | OUTPATIENT
Start: 2022-02-02 | End: 2023-02-07 | Stop reason: SDUPTHER

## 2022-02-02 NOTE — TELEPHONE ENCOUNTER
Per Dr. Emmanuel, called patient to let her know that her cxr was normal.  Had to leave voicemail.

## 2022-02-02 NOTE — PROGRESS NOTES
"      PROBLEM LIST:  1. [id number] (Stage IA) ER+ [place]+ Her2 negative invasive ductal carcinoma of the left breast  A) left lumpectomy on 3/29/19 showed a 1.0 cm low grade IDC.  0/3 SLN involved  B) She completed left breast radiation 5/30/2019.   C) Started endocrine therapy with anastrozole 6/3/2019.   2. Anxiety/depression  3. Hypertension  4. arthritis          Subjective     CHIEF COMPLAINT: breast cancer management     HISTORY OF PRESENT ILLNESS:   Gloria Alvarado returns for follow-up.  She continues to take anastrozole.  She is tolerating it pretty well.    She lost her sister to lung cancer in November, and her mother in January.  She lost 2 brothers and 2020.  She says it has been a tough couple of years.    She has noticed some shortness of breath with exertion and sometimes even with talking.  She sometimes wakes up at night with a cough.  She has no smoking history.    Objective      /91   Pulse 65   Temp 98.1 °F (36.7 °C)   Resp 18   Ht 162.6 cm (64\")   Wt 82.1 kg (181 lb)   SpO2 99%   BMI 31.07 kg/m²    Vitals:    02/02/22 1029   PainSc: 0-No pain             Performance Status: 0    General: well appearing female in no acute distress  Neuro: alert and oriented  HEENT: sclera anicteric, oropharynx clear  Lymphatics: no cervical, supraclavicular, or axillary adenopathy  Cardiovascular: regular rate and rhythm, no murmurs  Lungs: clear to auscultation bilaterally  Breasts: Left breast with well-healed lumpectomy incision, mild radiation changes noted no masses or discharge.  Right breast with no masses, skin changes or discharge  Abdomen: soft, nontender, nondistended.  No palpable organomegaly  Extremeties: no lower extremity edema  Skin: no rashes, lesions, bruising, or petechiae  Psych: mood and affect appropriate    I have reexamined the patient and the results are consistent with the previously documented exam. Nafisa Emmanuel MD            Assessment/Plan   Gloria Alvarado is a 66 y.o. " year old female  with a stage IA ER+ Her2 negative left breast cancer.   She continues on anastrozole.  We will continue the anastrozole for a minimum of 5 years.    Screening mammogram due November 2022.    Normal bone density May 18, 2021.  We will repeat bone density May 2023.    Shortness of breath and cough: Lungs are clear on exam and oxygen saturation is normal.  We will do a chest x-ray today.  If chest x-ray is normal but the symptoms continue, I would recommend she follow-up with her primary care provider.     Follow up in 6 months.            I spent 32 minutes caring for Gloria on this date of service. This time includes time spent by me in the following activities: preparing for the visit, reviewing tests, obtaining and/or reviewing a separately obtained history, performing a medically appropriate examination and/or evaluation, counseling and educating the patient/family/caregiver and referring and communicating with other health care professionals        Nafisa Emmanuel MD  [facility] Hematology and Oncology    2/2/2022          CC:

## 2022-08-02 ENCOUNTER — OFFICE VISIT (OUTPATIENT)
Dept: ONCOLOGY | Facility: CLINIC | Age: 67
End: 2022-08-02

## 2022-08-02 VITALS
TEMPERATURE: 98 F | SYSTOLIC BLOOD PRESSURE: 127 MMHG | OXYGEN SATURATION: 97 % | HEART RATE: 60 BPM | BODY MASS INDEX: 31.24 KG/M2 | RESPIRATION RATE: 18 BRPM | WEIGHT: 183 LBS | HEIGHT: 64 IN | DIASTOLIC BLOOD PRESSURE: 89 MMHG

## 2022-08-02 DIAGNOSIS — Z17.0 MALIGNANT NEOPLASM OF UPPER-INNER QUADRANT OF LEFT BREAST IN FEMALE, ESTROGEN RECEPTOR POSITIVE: ICD-10-CM

## 2022-08-02 DIAGNOSIS — C50.212 MALIGNANT NEOPLASM OF UPPER-INNER QUADRANT OF LEFT BREAST IN FEMALE, ESTROGEN RECEPTOR POSITIVE: ICD-10-CM

## 2022-08-02 DIAGNOSIS — Z12.31 SCREENING MAMMOGRAM FOR BREAST CANCER: Primary | ICD-10-CM

## 2022-08-02 PROCEDURE — 99213 OFFICE O/P EST LOW 20 MIN: CPT | Performed by: NURSE PRACTITIONER

## 2022-08-02 RX ORDER — ROSUVASTATIN CALCIUM 10 MG/1
10 TABLET, COATED ORAL DAILY
COMMUNITY
Start: 2022-06-09

## 2022-08-02 RX ORDER — TRAZODONE HYDROCHLORIDE 50 MG/1
50 TABLET ORAL AS NEEDED
COMMUNITY
Start: 2022-07-17

## 2022-08-02 NOTE — PROGRESS NOTES
"      PROBLEM LIST:  1. [id number] (Stage IA) ER+ [place]+ Her2 negative invasive ductal carcinoma of the left breast  A) left lumpectomy on 3/29/19 showed a 1.0 cm low grade IDC.  0/3 SLN involved  B) She completed left breast radiation 5/30/2019.   C) Started endocrine therapy with anastrozole 6/3/2019.   2. Anxiety/depression  3. Hypertension  4. arthritis          Subjective     CHIEF COMPLAINT: breast cancer management     HISTORY OF PRESENT ILLNESS:   She is here for follow-up of breast cancer.  She continues on anastrozole and is tolerating it well.  She denies any new concerns today regarding the anastrozole.    She has bilateral knee pain and states in the future she will need knee replacement.  Since having her knee pain intensifies she is also having bilateral foot pain.  She follows with [facility] orthopedics.    She denies any cough or shortness of air.  No diplopia or headaches.      Objective      /89 Comment: RUE  Pulse 60   Temp 98 °F (36.7 °C) (Infrared)   Resp 18   Ht 162.6 cm (64\")   Wt 83 kg (183 lb)   SpO2 97% Comment: RA  BMI 31.41 kg/m²    Vitals:    08/02/22 1112   PainSc:   7   PainLoc: Foot  Comment: Bilateral             Performance Status: 0    General: well appearing female in no acute distress  Neuro: alert and oriented  HEENT: sclera anicteric, oropharynx clear  Lymphatics: no cervical, supraclavicular, or axillary adenopathy  Cardiovascular: regular rate and rhythm, no murmurs  Lungs: clear to auscultation bilaterally  Breasts: Left breast with well-healed lumpectomy incision, mild radiation changes noted no masses or discharge.  Right breast with no masses, skin changes or discharge  Abdomen: soft, nontender, nondistended.  No palpable organomegaly  Extremeties: no lower extremity edema  Skin: no rashes, lesions, bruising, or petechiae  Psych: mood and affect appropriate    I have reexamined the patient and the results are consistent with the previously documented exam. Makenzie" SERGIO Hernandez            Assessment & Plan   Gloria Alvarado is a 66 y.o. year old female  with a stage IA ER+ Her2 negative left breast cancer.   She continues on anastrozole.  We will continue the anastrozole for a minimum of 5 years.    Screening mammogram due November 2022.  Order placed today.    Normal bone density May 18, 2021.  We will repeat bone density May 2023.    Follow-up in 6 months.                 I spent 25 minutes caring for Gloria on this date of service. This time includes time spent by me in the following activities: preparing for the visit, obtaining and/or reviewing a separately obtained history, performing a medically appropriate examination and/or evaluation, counseling and educating the patient/family/caregiver, ordering medications, tests, or procedures and documenting information in the medical record          Makenzie Groves APRN  [facility] Hematology and Oncology    8/2/2022          CC:

## 2022-10-17 ENCOUNTER — TELEPHONE (OUTPATIENT)
Dept: ONCOLOGY | Facility: CLINIC | Age: 67
End: 2022-10-17

## 2022-10-17 NOTE — TELEPHONE ENCOUNTER
Caller: Gloria Alvarado    Relationship: Self    Best call back number: [phone]    What is the best time to reach you: ANY    Who are you requesting to speak with (clinical staff, provider,  specific staff member): CLINICAL STAFF    What was the call regarding: PT IS SCHEDULED FOR KNEE SURGERY ON 11/09/22. WAS TOLD TO CALL AND SEE IF IT IS OKAY IF SHE STOPS TAKING HER ANASTROZOLE 10 DAYS PRIOR TO HER SURGERY    Do you require a callback: YES

## 2022-10-17 NOTE — TELEPHONE ENCOUNTER
Patient is scheduled for partial right knee replacement on 11/9/22. Discussed with SERGIO Chowdhury we do not typically have to hold anastrozole prior to surgery but if required per surgeon she is okay with patient holding it. Patient verbalized understanding.

## 2022-11-05 ENCOUNTER — HOSPITAL ENCOUNTER (OUTPATIENT)
Dept: MAMMOGRAPHY | Facility: HOSPITAL | Age: 67
Discharge: HOME | End: 2022-11-05
Admitting: NURSE PRACTITIONER

## 2022-11-05 DIAGNOSIS — Z12.31 SCREENING MAMMOGRAM FOR BREAST CANCER: ICD-10-CM

## 2022-11-05 PROCEDURE — 77063 BREAST TOMOSYNTHESIS BI: CPT

## 2022-11-05 PROCEDURE — 77067 SCR MAMMO BI INCL CAD: CPT | Performed by: RADIOLOGY

## 2022-11-05 PROCEDURE — 77063 BREAST TOMOSYNTHESIS BI: CPT | Performed by: RADIOLOGY

## 2022-11-05 PROCEDURE — 77067 SCR MAMMO BI INCL CAD: CPT

## 2022-11-11 ENCOUNTER — APPOINTMENT (OUTPATIENT)
Dept: MAMMOGRAPHY | Facility: HOSPITAL | Age: 67
End: 2022-11-11

## 2023-02-07 ENCOUNTER — OFFICE VISIT (OUTPATIENT)
Dept: ONCOLOGY | Facility: CLINIC | Age: 68
End: 2023-02-07
Payer: MEDICARE

## 2023-02-07 VITALS
HEART RATE: 68 BPM | TEMPERATURE: 97.1 F | RESPIRATION RATE: 18 BRPM | BODY MASS INDEX: 31.58 KG/M2 | SYSTOLIC BLOOD PRESSURE: 133 MMHG | HEIGHT: 64 IN | DIASTOLIC BLOOD PRESSURE: 85 MMHG | OXYGEN SATURATION: 98 % | WEIGHT: 185 LBS

## 2023-02-07 DIAGNOSIS — Z17.0 MALIGNANT NEOPLASM OF UPPER-INNER QUADRANT OF LEFT BREAST IN FEMALE, ESTROGEN RECEPTOR POSITIVE: Primary | ICD-10-CM

## 2023-02-07 DIAGNOSIS — Z12.31 ENCOUNTER FOR SCREENING MAMMOGRAM FOR MALIGNANT NEOPLASM OF BREAST: ICD-10-CM

## 2023-02-07 DIAGNOSIS — Z79.811 LONG TERM CURRENT USE OF AROMATASE INHIBITOR: ICD-10-CM

## 2023-02-07 DIAGNOSIS — C50.212 MALIGNANT NEOPLASM OF UPPER-INNER QUADRANT OF LEFT BREAST IN FEMALE, ESTROGEN RECEPTOR POSITIVE: Primary | ICD-10-CM

## 2023-02-07 PROCEDURE — 99213 OFFICE O/P EST LOW 20 MIN: CPT | Performed by: NURSE PRACTITIONER

## 2023-02-07 RX ORDER — TRIAMCINOLONE ACETONIDE 1 MG/G
CREAM TOPICAL
COMMUNITY
Start: 2022-12-14

## 2023-02-07 RX ORDER — ASPIRIN 81 MG/1
TABLET, COATED ORAL
COMMUNITY
Start: 2022-11-08

## 2023-02-07 RX ORDER — ANASTROZOLE 1 MG/1
1 TABLET ORAL DAILY
Qty: 90 TABLET | Refills: 3 | Status: SHIPPED | OUTPATIENT
Start: 2023-02-07

## 2023-02-07 NOTE — PROGRESS NOTES
"      PROBLEM LIST:  1. [id number] (Stage IA) ER+ HI+ Her2 negative invasive ductal carcinoma of the left breast  A) left lumpectomy on 3/29/19 showed a 1.0 cm low grade IDC.  0/3 SLN involved  B) She completed left breast radiation 5/30/2019.   C) Started endocrine therapy with anastrozole 6/3/2019.   2. Anxiety/depression  3. Hypertension  4. arthritis          Subjective     CHIEF COMPLAINT: breast cancer management     HISTORY OF PRESENT ILLNESS:   She is here for follow-up of breast cancer.  She continues on anastrozole and is tolerating it well.      She had a right knee replacement 11/9/2022.  She is recovering well from surgery.  She is almost through with physical therapy.  She does have some achiness in her legs at bedtime since the surgery.  During the day she has no pain.      Objective      /85 Comment: RUE  Pulse 68   Temp 97.1 °F (36.2 °C) (Infrared)   Resp 18   Ht 162.6 cm (64\")   Wt 83.9 kg (185 lb)   SpO2 98% Comment: RA  BMI 31.76 kg/m²    Vitals:    02/07/23 1026   PainSc: 0-No pain             Performance Status: 0    General: well appearing female in no acute distress  Neuro: alert and oriented  HEENT: sclera anicteric, oropharynx clear  Lymphatics: no cervical, supraclavicular, or axillary adenopathy  Cardiovascular: regular rate and rhythm, no murmurs  Lungs: clear to auscultation bilaterally  Breasts: Left breast with well-healed lumpectomy incision, mild radiation changes noted no masses or discharge.  Right breast with no masses, skin changes or discharge  Abdomen: soft, nontender, nondistended.  No palpable organomegaly  Extremeties: no lower extremity edema  Skin: no rashes, lesions, bruising, or petechiae  Psych: mood and affect appropriate    I have reexamined the patient and the results are consistent with the previously documented exam. SERGIO Jesus            Assessment & Plan   Gloria DANG OvertonAlvarado is a 67 y.o. year old female  with a stage IA ER+ Her2 negative " left breast cancer.   She continues on anastrozole.  We will continue the anastrozole for a minimum of 5 years.  Refill for anastrozole sent to pharmacy today.    Screening mammogram 11/5/2022 BI-RADS Category 2 benign.  Order placed for repeat mammogram due November 2023.    Normal bone density May 18, 2021.  We will repeat bone density May 2023. Order placed today.     Follow-up in 6 months.                           Makenzie Groves APRN  [facility]    2/7/2023          CC:

## 2023-02-22 ENCOUNTER — TELEPHONE (OUTPATIENT)
Dept: ONCOLOGY | Facility: CLINIC | Age: 68
End: 2023-02-22
Payer: COMMERCIAL

## 2023-02-22 NOTE — TELEPHONE ENCOUNTER
Caller: Ita Alvarado    Relationship: Self    Best call back number: [phone]    What was the call regarding: ITA CALLED REGARDING HER ANASTROZOLE. SHE IS CURRENTLY TAKING IT AT 6:30PM EVERY DAY. SHE IS WONDERING IF SHE SHOULD CHANGE TAKING IT TO THE MORNING SINCE SHE IS HAVING LEG ACHES IN THE EVENING AS WELL AS TAKING CHOLESTEROL MEDICATION IN THE EVENING.     Do you require a callback: YES

## 2023-02-22 NOTE — TELEPHONE ENCOUNTER
I called and spoke with patient and told her that per Dr. Emmanuel it is okay for her to change the time of when she is taking the anastrozole.  I told her that she should call back if the pain is persistent as we sometimes stop the med for a bit to see if that helps with the pain.  She is also taking a statin which can cause joint pain.  Patient verbalized understanding.

## 2023-04-07 ENCOUNTER — HOSPITAL ENCOUNTER (OUTPATIENT)
Dept: BONE DENSITY | Facility: HOSPITAL | Age: 68
Discharge: HOME | End: 2023-04-07
Admitting: NURSE PRACTITIONER
Payer: MEDICARE

## 2023-04-07 DIAGNOSIS — C50.212 MALIGNANT NEOPLASM OF UPPER-INNER QUADRANT OF LEFT BREAST IN FEMALE, ESTROGEN RECEPTOR POSITIVE: ICD-10-CM

## 2023-04-07 DIAGNOSIS — Z79.811 LONG TERM CURRENT USE OF AROMATASE INHIBITOR: ICD-10-CM

## 2023-04-07 DIAGNOSIS — Z17.0 MALIGNANT NEOPLASM OF UPPER-INNER QUADRANT OF LEFT BREAST IN FEMALE, ESTROGEN RECEPTOR POSITIVE: ICD-10-CM

## 2023-04-07 PROCEDURE — 77080 DXA BONE DENSITY AXIAL: CPT

## 2023-04-10 ENCOUNTER — TELEPHONE (OUTPATIENT)
Dept: ONCOLOGY | Facility: CLINIC | Age: 68
End: 2023-04-10
Payer: COMMERCIAL

## 2023-04-10 NOTE — TELEPHONE ENCOUNTER
Notified patient of bone density scan results.  Bone density results were normal.  Encouraged to continue vitamin D supplement and weightbearing exercise such as walking 30 minutes 5 days a week.  We will repeat bone density testing in 2 years.

## 2023-04-18 ENCOUNTER — HOSPITAL ENCOUNTER (OUTPATIENT)
Dept: CARDIOLOGY | Facility: HOSPITAL | Age: 68
Discharge: HOME | End: 2023-04-18
Admitting: NURSE PRACTITIONER
Payer: MEDICARE

## 2023-04-18 ENCOUNTER — TRANSCRIBE ORDERS (OUTPATIENT)
Dept: ADMINISTRATIVE | Facility: HOSPITAL | Age: 68
End: 2023-04-18
Payer: COMMERCIAL

## 2023-04-18 VITALS — HEIGHT: 64 IN | WEIGHT: 185 LBS | BODY MASS INDEX: 31.58 KG/M2

## 2023-04-18 DIAGNOSIS — M79.661 BILATERAL CALF PAIN: Primary | ICD-10-CM

## 2023-04-18 DIAGNOSIS — M79.661 BILATERAL CALF PAIN: ICD-10-CM

## 2023-04-18 DIAGNOSIS — M79.662 BILATERAL CALF PAIN: Primary | ICD-10-CM

## 2023-04-18 DIAGNOSIS — M79.662 BILATERAL CALF PAIN: ICD-10-CM

## 2023-04-18 LAB

## 2023-04-18 PROCEDURE — 93970 EXTREMITY STUDY: CPT

## 2023-04-18 PROCEDURE — 93970 EXTREMITY STUDY: CPT | Performed by: INTERNAL MEDICINE

## 2023-04-19 ENCOUNTER — TRANSCRIBE ORDERS (OUTPATIENT)
Dept: ADMINISTRATIVE | Facility: HOSPITAL | Age: 68
End: 2023-04-19
Payer: COMMERCIAL

## 2023-04-19 DIAGNOSIS — M79.661 RIGHT CALF PAIN: Primary | ICD-10-CM

## 2023-05-11 ENCOUNTER — TRANSCRIBE ORDERS (OUTPATIENT)
Dept: ADMINISTRATIVE | Facility: HOSPITAL | Age: 68
End: 2023-05-11
Payer: COMMERCIAL

## 2023-05-11 DIAGNOSIS — Z12.31 VISIT FOR SCREENING MAMMOGRAM: Primary | ICD-10-CM

## 2023-08-10 ENCOUNTER — OFFICE VISIT (OUTPATIENT)
Dept: ONCOLOGY | Facility: CLINIC | Age: 68
End: 2023-08-10
Payer: MEDICARE

## 2023-08-10 VITALS
HEART RATE: 63 BPM | HEIGHT: 64 IN | SYSTOLIC BLOOD PRESSURE: 128 MMHG | TEMPERATURE: 96.9 F | DIASTOLIC BLOOD PRESSURE: 88 MMHG | WEIGHT: 189 LBS | BODY MASS INDEX: 32.27 KG/M2

## 2023-08-10 DIAGNOSIS — C50.212 MALIGNANT NEOPLASM OF UPPER-INNER QUADRANT OF LEFT BREAST IN FEMALE, ESTROGEN RECEPTOR POSITIVE: Primary | ICD-10-CM

## 2023-08-10 DIAGNOSIS — Z17.0 MALIGNANT NEOPLASM OF UPPER-INNER QUADRANT OF LEFT BREAST IN FEMALE, ESTROGEN RECEPTOR POSITIVE: Primary | ICD-10-CM

## 2023-08-10 PROCEDURE — 1126F AMNT PAIN NOTED NONE PRSNT: CPT | Performed by: INTERNAL MEDICINE

## 2023-08-10 PROCEDURE — 99214 OFFICE O/P EST MOD 30 MIN: CPT | Performed by: INTERNAL MEDICINE

## 2023-08-10 NOTE — PROGRESS NOTES
"      PROBLEM LIST:  1. [id number] (Stage IA) ER+ DC+ Her2 negative invasive ductal carcinoma of the left breast  A) left lumpectomy on 3/29/19 showed a 1.0 cm low grade IDC.  0/3 SLN involved  B) She completed left breast radiation 5/30/2019.   C) Started endocrine therapy with anastrozole 6/3/2019.   2. Anxiety/depression  3. Hypertension  4. arthritis          Subjective     CHIEF COMPLAINT: breast cancer management     HISTORY OF PRESENT ILLNESS:   She is here for follow-up of breast cancer.  She continues on anastrozole and is tolerating it well.   No new complaint or concerns.          Objective      /88   Pulse 63   Temp 96.9 øF (36.1 øC) (Infrared)   Ht 162.6 cm (64\")   Wt 85.7 kg (189 lb)   BMI 32.44 kg/mý    Vitals:    08/10/23 1138   PainSc: 0-No pain             Performance Status: 0    General: well appearing female in no acute distress  Neuro: alert and oriented  HEENT: sclera anicteric, oropharynx clear  Lymphatics: no cervical, supraclavicular, or axillary adenopathy  Cardiovascular: regular rate and rhythm, no murmurs  Lungs: clear to auscultation bilaterally  Breasts: Left breast with well-healed lumpectomy incision, mild radiation changes noted no masses or discharge.  Right breast with no masses, skin changes or discharge  Abdomen: soft, nontender, nondistended.  No palpable organomegaly  Extremeties: no lower extremity edema  Skin: no rashes, lesions, bruising, or petechiae  Psych: mood and affect appropriate    I have reexamined the patient and the results are consistent with the previously documented exam. Nafisa Emmanuel MD            Assessment & Plan   Gloria Alvarado is a 67 y.o. year old female  with a stage IA ER+ Her2 negative left breast cancer.   She continues on anastrozole.  We will continue the anastrozole for a total of 5 years.      Screening mammogram is scheduled for November.  She mentions that Dr. SHAFFER recommended a breast MRI.  She may need xanax to complete this exam " due to claustrophobia, but nothing is scheduled so far.    Bone density scan April 2023 showed normal bone density, overall stable compared to the prior study.    Follow-up in 6 months.                           Nafisa Emmanuel MD  [facility]    8/10/2023          CC:

## 2023-09-11 ENCOUNTER — TRANSCRIBE ORDERS (OUTPATIENT)
Dept: DIABETES SERVICES | Facility: HOSPITAL | Age: 68
End: 2023-09-11
Payer: COMMERCIAL

## 2023-09-11 DIAGNOSIS — R73.09 ELEVATED GLUCOSE: Primary | ICD-10-CM

## 2023-09-13 ENCOUNTER — HOSPITAL ENCOUNTER (OUTPATIENT)
Dept: DIABETES SERVICES | Facility: HOSPITAL | Age: 68
Setting detail: RECURRING SERIES
Discharge: HOME | End: 2023-09-13
Payer: MEDICARE

## 2023-10-13 ENCOUNTER — HOSPITAL ENCOUNTER (OUTPATIENT)
Dept: DIABETES SERVICES | Facility: HOSPITAL | Age: 68
Setting detail: RECURRING SERIES
Discharge: HOME | End: 2023-10-13
Payer: MEDICARE

## 2023-11-07 ENCOUNTER — HOSPITAL ENCOUNTER (OUTPATIENT)
Dept: MAMMOGRAPHY | Facility: HOSPITAL | Age: 68
Discharge: HOME | End: 2023-11-07
Admitting: INTERNAL MEDICINE
Payer: MEDICARE

## 2023-11-07 DIAGNOSIS — Z12.31 VISIT FOR SCREENING MAMMOGRAM: ICD-10-CM

## 2023-11-07 PROCEDURE — 77063 BREAST TOMOSYNTHESIS BI: CPT

## 2023-11-07 PROCEDURE — 77067 SCR MAMMO BI INCL CAD: CPT

## 2024-02-12 DIAGNOSIS — Z17.0 MALIGNANT NEOPLASM OF UPPER-INNER QUADRANT OF LEFT BREAST IN FEMALE, ESTROGEN RECEPTOR POSITIVE: ICD-10-CM

## 2024-02-12 DIAGNOSIS — C50.212 MALIGNANT NEOPLASM OF UPPER-INNER QUADRANT OF LEFT BREAST IN FEMALE, ESTROGEN RECEPTOR POSITIVE: ICD-10-CM

## 2024-02-12 RX ORDER — ANASTROZOLE 1 MG/1
1 TABLET ORAL DAILY
Qty: 90 TABLET | Refills: 0 | Status: SHIPPED | OUTPATIENT
Start: 2024-02-12

## 2024-02-13 ENCOUNTER — OFFICE VISIT (OUTPATIENT)
Dept: ONCOLOGY | Facility: CLINIC | Age: 69
End: 2024-02-13
Payer: MEDICARE

## 2024-02-13 VITALS
BODY MASS INDEX: 30.73 KG/M2 | TEMPERATURE: 97.3 F | DIASTOLIC BLOOD PRESSURE: 88 MMHG | OXYGEN SATURATION: 98 % | RESPIRATION RATE: 16 BRPM | HEART RATE: 66 BPM | SYSTOLIC BLOOD PRESSURE: 133 MMHG | WEIGHT: 180 LBS | HEIGHT: 64 IN

## 2024-02-13 DIAGNOSIS — C50.212 MALIGNANT NEOPLASM OF UPPER-INNER QUADRANT OF LEFT BREAST IN FEMALE, ESTROGEN RECEPTOR POSITIVE: Primary | ICD-10-CM

## 2024-02-13 DIAGNOSIS — Z17.0 MALIGNANT NEOPLASM OF UPPER-INNER QUADRANT OF LEFT BREAST IN FEMALE, ESTROGEN RECEPTOR POSITIVE: Primary | ICD-10-CM

## 2024-02-13 PROCEDURE — 1160F RVW MEDS BY RX/DR IN RCRD: CPT | Performed by: NURSE PRACTITIONER

## 2024-02-13 PROCEDURE — 1126F AMNT PAIN NOTED NONE PRSNT: CPT | Performed by: NURSE PRACTITIONER

## 2024-02-13 PROCEDURE — 1159F MED LIST DOCD IN RCRD: CPT | Performed by: NURSE PRACTITIONER

## 2024-02-13 PROCEDURE — 99213 OFFICE O/P EST LOW 20 MIN: CPT | Performed by: NURSE PRACTITIONER

## 2024-02-13 RX ORDER — TRAMADOL HYDROCHLORIDE 50 MG/1
TABLET ORAL
COMMUNITY
Start: 2024-01-02

## 2024-02-13 RX ORDER — CYCLOBENZAPRINE HCL 5 MG
TABLET ORAL
COMMUNITY
Start: 2024-01-04

## 2024-05-09 DIAGNOSIS — Z17.0 MALIGNANT NEOPLASM OF UPPER-INNER QUADRANT OF LEFT BREAST IN FEMALE, ESTROGEN RECEPTOR POSITIVE: ICD-10-CM

## 2024-05-09 DIAGNOSIS — C50.212 MALIGNANT NEOPLASM OF UPPER-INNER QUADRANT OF LEFT BREAST IN FEMALE, ESTROGEN RECEPTOR POSITIVE: ICD-10-CM

## 2024-05-09 RX ORDER — ANASTROZOLE 1 MG/1
1 TABLET ORAL DAILY
Qty: 90 TABLET | Refills: 0 | Status: SHIPPED | OUTPATIENT
Start: 2024-05-09

## 2024-06-12 ENCOUNTER — OFFICE VISIT (OUTPATIENT)
Dept: ONCOLOGY | Facility: CLINIC | Age: 69
End: 2024-06-12
Payer: MEDICARE

## 2024-06-12 VITALS
RESPIRATION RATE: 18 BRPM | OXYGEN SATURATION: 95 % | BODY MASS INDEX: 30.22 KG/M2 | HEIGHT: 64 IN | WEIGHT: 177 LBS | DIASTOLIC BLOOD PRESSURE: 84 MMHG | SYSTOLIC BLOOD PRESSURE: 152 MMHG | TEMPERATURE: 97.2 F | HEART RATE: 71 BPM

## 2024-06-12 DIAGNOSIS — Z12.31 ENCOUNTER FOR SCREENING MAMMOGRAM FOR MALIGNANT NEOPLASM OF BREAST: ICD-10-CM

## 2024-06-12 DIAGNOSIS — C50.212 MALIGNANT NEOPLASM OF UPPER-INNER QUADRANT OF LEFT BREAST IN FEMALE, ESTROGEN RECEPTOR POSITIVE: Primary | ICD-10-CM

## 2024-06-12 DIAGNOSIS — Z17.0 MALIGNANT NEOPLASM OF UPPER-INNER QUADRANT OF LEFT BREAST IN FEMALE, ESTROGEN RECEPTOR POSITIVE: Primary | ICD-10-CM

## 2024-06-12 PROCEDURE — 1126F AMNT PAIN NOTED NONE PRSNT: CPT | Performed by: INTERNAL MEDICINE

## 2024-06-12 PROCEDURE — 99213 OFFICE O/P EST LOW 20 MIN: CPT | Performed by: INTERNAL MEDICINE

## 2024-06-12 NOTE — PROGRESS NOTES
"      PROBLEM LIST:  1. [id number] (Stage IA) ER+ RI+ Her2 negative invasive ductal carcinoma of the left breast  A) left lumpectomy on 3/29/19 showed a 1.0 cm low grade IDC.  0/3 SLN involved  B) She completed left breast radiation 5/30/2019.   C) Started endocrine therapy with anastrozole 6/3/2019.  Completed 5 years in June 2024.  2. Anxiety/depression  3. Hypertension  4. arthritis          Subjective     CHIEF COMPLAINT: breast cancer management     HISTORY OF PRESENT ILLNESS:   She is here for follow-up of breast cancer.  She continues on anastrozole.  She says she is doing well.  No new complaints or concerns.          Objective      /84   Pulse 71   Temp 97.2 °F (36.2 °C)   Resp 18   Ht 162.6 cm (64\")   Wt 80.3 kg (177 lb)   SpO2 95%   BMI 30.38 kg/m²    Vitals:    06/12/24 1049   PainSc: 0-No pain             Performance Status: 0    General: well appearing female in no acute distress  Neuro: alert and oriented  HEENT: sclera anicteric, oropharynx clear  Lymphatics: no cervical, supraclavicular, or axillary adenopathy  Cardiovascular: regular rate and rhythm, no murmurs  Lungs: clear to auscultation bilaterally  Breasts: Left breast with well-healed lumpectomy incision, mild radiation changes noted no masses or discharge.  Right breast with no masses, skin changes or discharge  Abdomen: soft, nontender, nondistended.  No palpable organomegaly  Extremeties: no lower extremity edema  Skin: no rashes, lesions, bruising, or petechiae  Psych: mood and affect appropriate    I have reexamined the patient and the results are consistent with the previously documented exam. Nafisa Emmanuel MD            Assessment & Plan   Gloria Alvarado is a 68 y.o. year old female  with a stage IA ER+ Her2 negative left breast cancer.   She continues on anastrozole.    He has completed 5 years of endocrine therapy.  She has a low risk breast cancer and we will stop anastrozole at this time.    Screening mammogram ordered " for November 2024..    Bone density scan April 2023 showed normal bone density, overall stable compared to the prior study.    She would like to continue to follow-up yearly in our clinic.  We will see her back in 1 year in the survivorship clinic.                           Nafisa Emmanuel MD  [facility]    6/12/2024          CC:

## 2024-09-17 ENCOUNTER — TRANSCRIBE ORDERS (OUTPATIENT)
Dept: GENERAL RADIOLOGY | Facility: HOSPITAL | Age: 69
End: 2024-09-17
Payer: COMMERCIAL

## 2024-09-17 ENCOUNTER — HOSPITAL ENCOUNTER (OUTPATIENT)
Dept: GENERAL RADIOLOGY | Facility: HOSPITAL | Age: 69
Discharge: HOME | End: 2024-09-17
Admitting: INTERNAL MEDICINE
Payer: MEDICARE

## 2024-09-17 DIAGNOSIS — K62.89 RECTAL PAIN: ICD-10-CM

## 2024-09-17 DIAGNOSIS — K62.89 RECTAL PAIN: Primary | ICD-10-CM

## 2024-09-17 PROCEDURE — 74018 RADEX ABDOMEN 1 VIEW: CPT

## 2024-10-28 LAB — NCCN CRITERIA FLAG: ABNORMAL

## 2024-10-28 NOTE — PROGRESS NOTES
This patient recently took the CARE risk assessment as part of their mammogram appointment. Based on the patient's responses, NCCN criteria for genetic testing was met.     Navigator follow-up:   The patient had genetic counseling and genetic testing in 2019.  The CancerNext panel was ordered through [employer] which analyzes BRCA1/2 and 32 additional genes associated with an increased cancer risk. Genetic testing was negative for known deleterious mutations in BRCA1/2 and 32 additional genes on the CancerNext panel.  No additional testing is indicated at this time.

## 2024-11-12 ENCOUNTER — HOSPITAL ENCOUNTER (OUTPATIENT)
Dept: MAMMOGRAPHY | Facility: HOSPITAL | Age: 69
Discharge: HOME | End: 2024-11-12
Admitting: INTERNAL MEDICINE
Payer: MEDICARE

## 2024-11-12 DIAGNOSIS — Z12.31 ENCOUNTER FOR SCREENING MAMMOGRAM FOR MALIGNANT NEOPLASM OF BREAST: ICD-10-CM

## 2024-11-12 PROCEDURE — 77063 BREAST TOMOSYNTHESIS BI: CPT

## 2024-11-12 PROCEDURE — 77067 SCR MAMMO BI INCL CAD: CPT

## 2025-06-11 ENCOUNTER — OFFICE VISIT (OUTPATIENT)
Dept: ONCOLOGY | Facility: CLINIC | Age: 70
End: 2025-06-11
Payer: MEDICARE

## 2025-06-11 VITALS
HEIGHT: 64 IN | HEART RATE: 60 BPM | OXYGEN SATURATION: 99 % | BODY MASS INDEX: 32.85 KG/M2 | DIASTOLIC BLOOD PRESSURE: 94 MMHG | WEIGHT: 192.4 LBS | SYSTOLIC BLOOD PRESSURE: 135 MMHG | TEMPERATURE: 97.1 F | RESPIRATION RATE: 16 BRPM

## 2025-06-11 DIAGNOSIS — C50.212 MALIGNANT NEOPLASM OF UPPER-INNER QUADRANT OF LEFT BREAST IN FEMALE, ESTROGEN RECEPTOR POSITIVE: Primary | ICD-10-CM

## 2025-06-11 DIAGNOSIS — Z12.31 ENCOUNTER FOR SCREENING MAMMOGRAM FOR MALIGNANT NEOPLASM OF BREAST: ICD-10-CM

## 2025-06-11 DIAGNOSIS — Z17.0 MALIGNANT NEOPLASM OF UPPER-INNER QUADRANT OF LEFT BREAST IN FEMALE, ESTROGEN RECEPTOR POSITIVE: Primary | ICD-10-CM

## 2025-06-11 RX ORDER — ROSUVASTATIN CALCIUM 5 MG/1
5 TABLET, COATED ORAL DAILY
COMMUNITY
Start: 2025-04-07

## 2025-06-11 NOTE — PROGRESS NOTES
"      PROBLEM LIST:  1. [id number] (Stage IA) ER+ [place]+ Her2 negative invasive ductal carcinoma of the left breast  A) left lumpectomy on 3/29/19 showed a 1.0 cm low grade IDC.  0/3 SLN involved  B) She completed left breast radiation 5/30/2019.   C) Started endocrine therapy with anastrozole 6/3/2019.  Completed 5 years in June 2024.  2. Anxiety/depression  3. Hypertension  4. arthritis          Subjective     CHIEF COMPLAINT: breast cancer management     HISTORY OF PRESENT ILLNESS:   She is here for follow-up of breast cancer.  She has been well over the past year.  She does have some joint pain particularly in her left knee and bilateral feet due to arthritis.  She denies any new medical concerns today.          Objective      /94   Pulse 60   Temp 97.1 °F (36.2 °C) (Temporal)   Resp 16   Ht 162.6 cm (64.02\")   Wt 87.3 kg (192 lb 6.4 oz)   SpO2 99%   BMI 33.01 kg/m²    Vitals:    06/11/25 1312   PainSc: 0-No pain   PainLoc: Comment: feet               Performance Status: 0    General: well appearing female in no acute distress  Neuro: alert and oriented  Lymphatics: no cervical, supraclavicular, or axillary adenopathy  Cardiovascular: regular rate and rhythm, no murmurs  Lungs: clear to auscultation bilaterally  Breasts: Left breast with well-healed lumpectomy incision, mild radiation changes noted no masses or discharge.  Right breast with no masses, skin changes or discharge  Abdomen: soft, nontender, nondistended.    Extremeties: no lower extremity edema  Psych: mood and affect appropriate    I have reexamined the patient and the results are consistent with the previously documented exam. Makenzie Groves, SERGIO            Assessment & Plan   Gloriaelise Alvarado is a 69 y.o. year old female  with a stage IA ER+ Her2 negative left breast cancer.      She has completed 5 years of endocrine therapy June 2024.  Clinically she is doing well with no evidence of disease recurrence at this time.     Bilateral " screening mammogram 11/12/2024 BI RADS category 2, benign.  Screening mammogram ordered for November 2025.     She would like to continue to follow-up yearly in our clinic.      Follow up in 1 year.                       Makenzie Groves APRCONNOR  [facility]    6/11/2025          CC: